# Patient Record
Sex: FEMALE | Race: WHITE | NOT HISPANIC OR LATINO | Employment: FULL TIME | URBAN - METROPOLITAN AREA
[De-identification: names, ages, dates, MRNs, and addresses within clinical notes are randomized per-mention and may not be internally consistent; named-entity substitution may affect disease eponyms.]

---

## 2019-03-14 ENCOUNTER — LAB VISIT (OUTPATIENT)
Dept: LAB | Facility: HOSPITAL | Age: 54
End: 2019-03-14
Attending: INTERNAL MEDICINE
Payer: COMMERCIAL

## 2019-03-14 ENCOUNTER — OFFICE VISIT (OUTPATIENT)
Dept: HEMATOLOGY/ONCOLOGY | Facility: CLINIC | Age: 54
End: 2019-03-14
Payer: COMMERCIAL

## 2019-03-14 VITALS
OXYGEN SATURATION: 97 % | WEIGHT: 293 LBS | TEMPERATURE: 98 F | BODY MASS INDEX: 47.09 KG/M2 | HEART RATE: 97 BPM | DIASTOLIC BLOOD PRESSURE: 79 MMHG | HEIGHT: 66 IN | SYSTOLIC BLOOD PRESSURE: 139 MMHG

## 2019-03-14 DIAGNOSIS — Z85.3 PERSONAL HISTORY OF MALIGNANT NEOPLASM OF BREAST: ICD-10-CM

## 2019-03-14 DIAGNOSIS — C50.912 MALIGNANT NEOPLASM OF LEFT BREAST IN FEMALE, ESTROGEN RECEPTOR POSITIVE, UNSPECIFIED SITE OF BREAST: ICD-10-CM

## 2019-03-14 DIAGNOSIS — Z17.0 MALIGNANT NEOPLASM OF LEFT BREAST IN FEMALE, ESTROGEN RECEPTOR POSITIVE, UNSPECIFIED SITE OF BREAST: Primary | ICD-10-CM

## 2019-03-14 DIAGNOSIS — C50.912 MALIGNANT NEOPLASM OF LEFT BREAST IN FEMALE, ESTROGEN RECEPTOR POSITIVE, UNSPECIFIED SITE OF BREAST: Primary | ICD-10-CM

## 2019-03-14 DIAGNOSIS — Z17.0 MALIGNANT NEOPLASM OF LEFT BREAST IN FEMALE, ESTROGEN RECEPTOR POSITIVE, UNSPECIFIED SITE OF BREAST: ICD-10-CM

## 2019-03-14 LAB
ALBUMIN SERPL BCP-MCNC: 3.9 G/DL
ALP SERPL-CCNC: 133 U/L
ALT SERPL W/O P-5'-P-CCNC: 62 U/L
ANION GAP SERPL CALC-SCNC: 10 MMOL/L
AST SERPL-CCNC: 35 U/L
BASOPHILS # BLD AUTO: 0.04 K/UL
BASOPHILS NFR BLD: 0.5 %
BILIRUB SERPL-MCNC: 0.3 MG/DL
BUN SERPL-MCNC: 15 MG/DL
CALCIUM SERPL-MCNC: 9.9 MG/DL
CHLORIDE SERPL-SCNC: 104 MMOL/L
CO2 SERPL-SCNC: 27 MMOL/L
CREAT SERPL-MCNC: 1.1 MG/DL
DIFFERENTIAL METHOD: ABNORMAL
EOSINOPHIL # BLD AUTO: 0.3 K/UL
EOSINOPHIL NFR BLD: 3.7 %
ERYTHROCYTE [DISTWIDTH] IN BLOOD BY AUTOMATED COUNT: 13.7 %
EST. GFR  (AFRICAN AMERICAN): >60 ML/MIN/1.73 M^2
EST. GFR  (NON AFRICAN AMERICAN): 57 ML/MIN/1.73 M^2
GLUCOSE SERPL-MCNC: 102 MG/DL
HCT VFR BLD AUTO: 41.9 %
HGB BLD-MCNC: 14.1 G/DL
LYMPHOCYTES # BLD AUTO: 2.3 K/UL
LYMPHOCYTES NFR BLD: 27 %
MCH RBC QN AUTO: 31.4 PG
MCHC RBC AUTO-ENTMCNC: 33.7 G/DL
MCV RBC AUTO: 93 FL
MONOCYTES # BLD AUTO: 0.6 K/UL
MONOCYTES NFR BLD: 7 %
NEUTROPHILS # BLD AUTO: 5.2 K/UL
NEUTROPHILS NFR BLD: 61.8 %
PLATELET # BLD AUTO: 266 K/UL
PMV BLD AUTO: 10 FL
POTASSIUM SERPL-SCNC: 3.7 MMOL/L
PROT SERPL-MCNC: 7.9 G/DL
RBC # BLD AUTO: 4.49 M/UL
SODIUM SERPL-SCNC: 141 MMOL/L
WBC # BLD AUTO: 8.4 K/UL

## 2019-03-14 PROCEDURE — 80053 COMPREHEN METABOLIC PANEL: CPT

## 2019-03-14 PROCEDURE — 3008F PR BODY MASS INDEX (BMI) DOCUMENTED: ICD-10-PCS | Mod: CPTII,S$GLB,, | Performed by: INTERNAL MEDICINE

## 2019-03-14 PROCEDURE — 3078F DIAST BP <80 MM HG: CPT | Mod: CPTII,S$GLB,, | Performed by: INTERNAL MEDICINE

## 2019-03-14 PROCEDURE — 85025 COMPLETE CBC W/AUTO DIFF WBC: CPT

## 2019-03-14 PROCEDURE — 99999 PR PBB SHADOW E&M-NEW PATIENT-LVL III: ICD-10-PCS | Mod: PBBFAC,,, | Performed by: INTERNAL MEDICINE

## 2019-03-14 PROCEDURE — 99213 PR OFFICE/OUTPT VISIT, EST, LEVL III, 20-29 MIN: ICD-10-PCS | Mod: S$GLB,,, | Performed by: INTERNAL MEDICINE

## 2019-03-14 PROCEDURE — 3008F BODY MASS INDEX DOCD: CPT | Mod: CPTII,S$GLB,, | Performed by: INTERNAL MEDICINE

## 2019-03-14 PROCEDURE — 99999 PR PBB SHADOW E&M-NEW PATIENT-LVL III: CPT | Mod: PBBFAC,,, | Performed by: INTERNAL MEDICINE

## 2019-03-14 PROCEDURE — 82378 CARCINOEMBRYONIC ANTIGEN: CPT

## 2019-03-14 PROCEDURE — 3078F PR MOST RECENT DIASTOLIC BLOOD PRESSURE < 80 MM HG: ICD-10-PCS | Mod: CPTII,S$GLB,, | Performed by: INTERNAL MEDICINE

## 2019-03-14 PROCEDURE — 3075F SYST BP GE 130 - 139MM HG: CPT | Mod: CPTII,S$GLB,, | Performed by: INTERNAL MEDICINE

## 2019-03-14 PROCEDURE — 86300 IMMUNOASSAY TUMOR CA 15-3: CPT

## 2019-03-14 PROCEDURE — 99213 OFFICE O/P EST LOW 20 MIN: CPT | Mod: S$GLB,,, | Performed by: INTERNAL MEDICINE

## 2019-03-14 PROCEDURE — 3075F PR MOST RECENT SYSTOLIC BLOOD PRESS GE 130-139MM HG: ICD-10-PCS | Mod: CPTII,S$GLB,, | Performed by: INTERNAL MEDICINE

## 2019-03-14 RX ORDER — ALLOPURINOL 100 MG/1
100 TABLET ORAL DAILY
COMMUNITY
End: 2023-04-14

## 2019-03-14 RX ORDER — LOSARTAN POTASSIUM 100 MG/1
100 TABLET ORAL DAILY
COMMUNITY
End: 2022-05-11 | Stop reason: SDUPTHER

## 2019-03-14 RX ORDER — AMLODIPINE BESYLATE 5 MG/1
5 TABLET ORAL DAILY
COMMUNITY
End: 2022-05-11 | Stop reason: SDUPTHER

## 2019-03-14 NOTE — PROGRESS NOTES
Chief Complaint :   Carcinoma Left Breast.    Hx of Present illness :  Patient is a 54 y.o. year old female who presents to the clinic today for annualo Oncology followup. Patient was under my care at [facility]. Dx'd to have carcinoma left Breast in Dec 2001. Underwent Bilateral mastectomy with reconstruction. Had Adjuvant chemotherapy with Taxotere, Epirubicin and Cytoxan and radiation. This was followed by five years of adjuvant harmonal therapy with Tamoxifen/Femara.      Allergies :    Review of patient's allergies indicates:   Allergen Reactions    Codeine Nausea Only       Occupation :  operations    Transfusion :  None    Menstrual & obstetric Hx :   2; para 2  Age of menarche: 10  Age of first pregnancy:  23  Lactation history: no  Age of menopause:  After chemoat age 38  HRT:  none    Present Meds :   Medication List with Changes/Refills   Current Medications    ASPIRIN 81 MG CHEWABLE TABLET    Take 1 tablet (81 mg total) by mouth once daily.    CYANOCOBALAMIN 1,000 MCG/ML INJECTION    Inject 1 mL (1,000 mcg total) into the muscle every 30 days.    ERGOCALCIFEROL (ERGOCALCIFEROL) 50,000 UNIT CAP    Take 1 capsule (50,000 Units total) by mouth twice a week.    FUROSEMIDE (LASIX) 40 MG TABLET    Take 1 tablet (40 mg total) by mouth daily as needed. Take  with  Potassium    LORAZEPAM (ATIVAN) 1 MG TABLET    TAKE 1 TABLET BY MOUTH EVERY 8 HOURS AS NEEDED    VALSARTAN (DIOVAN) 320 MG TABLET    Take 1 tablet (320 mg total) by mouth once daily.       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 status post mastectomy 2009    Chronic depression     GERD  (gastroesophageal reflux disease)     Hypercalcemia syndrome     Hyperlipidemia LDL goal <130     Hyperparathyroidism due to vitamin D deficiency     Insomnia     Lumbar radiculitis     Lymphedema of arm     Morbid obesity     Vitamin D deficiency disease        Travel Hx :  N/A    Immunization :  Immunization History   Administered Date(s) Administered    Pneumococcal Polysaccharide - 23 Valent 04/10/2014       Family Hx :  Family History   Problem Relation Age of Onset    COPD Mother     Heart disease Father     Hypertension Father     Hyperlipidemia Father     COPD Sister     Stroke Brother        Social Hx :  Social History     Socioeconomic History    Marital status:      Spouse name: Not on file    Number of children: 2    Years of education: [employer]    Highest education level: Not on file   Social Needs    Financial resource strain: Not on file    Food insecurity - worry: Not on file    Food insecurity - inability: Not on file    Transportation needs - medical: Not on file    Transportation needs - non-medical: Not on file   Occupational History    Occupation: Works for a [employer]     Employer: [employer]   Tobacco Use    Smoking status: Never Smoker   Substance and Sexual Activity    Alcohol use: No    Drug use: No    Sexual activity: Yes     Partners: Male   Other Topics Concern    Not on file   Social History Narrative    Adult Screenings updated and reviewed  4/10/14    Mammogram( for females) history of bilateral mastectomy    Pap ( for females)- years      Colonoscopy - done 2006 normal    Flu shot - 2013    Td 2005    Pneumovax updated 4/10/14 due again  2019    Zostavax recommended one time at  age  59    Eye exam  Overdue     Bone density overdue        Surgery :  Back surgery; Bilateral mastectomy with reconstruction.    Symptoms :    Review of Systems   Constitutional: Negative for chills, fever and weight loss.        Some weight Gain   HENT:  Negative for congestion, hearing loss, nosebleeds, sore throat and tinnitus.    Eyes: Negative for blurred vision, double vision and photophobia.        Wears glasses. Blind Left eye   Respiratory: Negative for cough, hemoptysis and shortness of breath.         Hx of Sleep Apnea. Uses C-PAP   Cardiovascular: Negative for chest pain, palpitations, orthopnea, claudication and leg swelling.   Gastrointestinal: Positive for constipation. Negative for abdominal pain, blood in stool, diarrhea, heartburn, nausea and vomiting.   Genitourinary: Negative for dysuria, flank pain, frequency, hematuria and urgency.   Musculoskeletal: Negative for back pain, joint pain, myalgias and neck pain.   Skin: Negative for itching and rash.   Neurological: Negative for dizziness, tingling, tremors and headaches.   Endo/Heme/Allergies: Negative for environmental allergies. Does not bruise/bleed easily.   Psychiatric/Behavioral: Negative for depression and hallucinations. The patient is nervous/anxious. The patient does not have insomnia.        Physical Exam :   Physical Exam   Constitutional: She is oriented to person, place, and time and well-developed, well-nourished, and in no distress. Vital signs are normal. No distress.   HENT:   Head: Normocephalic and atraumatic.   Right Ear: External ear normal.   Left Ear: External ear normal.   Nose: Nose normal.   Mouth/Throat: Oropharynx is clear and moist. No oropharyngeal exudate.   Eyes: Conjunctivae, EOM and lids are normal. Lids are everted and swept, no foreign bodies found. Right eye exhibits no discharge. Left eye exhibits no discharge. No scleral icterus.   Neck: Trachea normal, normal range of motion and phonation normal. Neck supple. Normal carotid pulses, no hepatojugular reflux and no JVD present. No tracheal tenderness present. Carotid bruit is not present. No tracheal deviation present. No thyroid mass and no thyromegaly present.   Cardiovascular: Normal rate, regular rhythm,  normal heart sounds, intact distal pulses and normal pulses. PMI is not displaced. Exam reveals no gallop and no friction rub.   No murmur heard.  Pulmonary/Chest: Effort normal and breath sounds normal. No stridor. No respiratory distress. She has no wheezes. She has no rales. She exhibits no tenderness.       Abdominal: Soft. Normal appearance, normal aorta and bowel sounds are normal. She exhibits no distension, no ascites and no mass. There is no hepatosplenomegaly. There is no tenderness. There is no rebound, no guarding and no CVA tenderness. No hernia.   Musculoskeletal: Normal range of motion. She exhibits edema. She exhibits no tenderness or deformity.        Arms:  Lymphadenopathy:        Head (right side): No submental, no submandibular, no tonsillar, no preauricular, no posterior auricular and no occipital adenopathy present.        Head (left side): No submental, no submandibular, no tonsillar, no preauricular, no posterior auricular and no occipital adenopathy present.     She has no cervical adenopathy.     She has no axillary adenopathy.        Right: No inguinal, no supraclavicular and no epitrochlear adenopathy present.        Left: No inguinal, no supraclavicular and no epitrochlear adenopathy present.   Neurological: She is alert and oriented to person, place, and time. She has normal reflexes. She displays normal reflexes. No cranial nerve deficit. She exhibits normal muscle tone. Gait normal. Coordination normal. GCS score is 15.   Skin: Skin is warm and dry. No rash noted. She is not diaphoretic. No erythema. No pallor.   Psychiatric: Mood, memory, affect and judgment normal.         Labs & Imaging : Pending        Dx :  Remote Hx of Carcinoma left Breast      Assessment & Plan:  Reviewed with patient. CBC,CMP,CEA,CA 27.29 today.  Advised to use Jobst pump. Continue annual followup.

## 2019-03-15 LAB — CEA SERPL-MCNC: <1 NG/ML

## 2019-03-18 LAB — CANCER AG27-29 SERPL-ACNC: 20.4 U/ML

## 2019-10-10 LAB
PAP RECOMMENDATION EXT: NORMAL
PAP SMEAR: NORMAL

## 2020-01-03 ENCOUNTER — HOSPITAL ENCOUNTER (OUTPATIENT)
Dept: CARDIOLOGY | Facility: HOSPITAL | Age: 55
Discharge: HOME | End: 2020-01-03
Attending: INTERNAL MEDICINE
Payer: COMMERCIAL

## 2020-01-03 ENCOUNTER — HOSPITAL ENCOUNTER (OUTPATIENT)
Dept: RADIOLOGY | Facility: HOSPITAL | Age: 55
Discharge: HOME | End: 2020-01-03
Attending: INTERNAL MEDICINE
Payer: COMMERCIAL

## 2020-01-03 VITALS — BODY MASS INDEX: 47.52 KG/M2 | WEIGHT: 290 LBS

## 2020-01-03 DIAGNOSIS — R06.02 SOB (SHORTNESS OF BREATH): Primary | ICD-10-CM

## 2020-01-03 DIAGNOSIS — R06.02 SOB (SHORTNESS OF BREATH): ICD-10-CM

## 2020-01-03 DIAGNOSIS — N93.9 ABNORMAL VAGINAL BLEEDING: ICD-10-CM

## 2020-01-03 DIAGNOSIS — N83.291 OTHER OVARIAN CYST, RIGHT SIDE: ICD-10-CM

## 2020-01-03 LAB
AORTIC ROOT ANNULUS: 2.42 CM
ASCENDING AORTA: 2.74 CM
AV INDEX (PROSTH): 0.7
AV MEAN GRADIENT: 7 MMHG
AV PEAK GRADIENT: 11 MMHG
AV VALVE AREA: 2.67 CM2
AV VELOCITY RATIO: 0.74
CV ECHO LV RWT: 0.38 CM
CV STRESS BASE HR: 85 BPM
DIASTOLIC BLOOD PRESSURE: 60 MMHG
DOP CALC AO PEAK VEL: 1.67 M/S
DOP CALC AO VTI: 32.41 CM
DOP CALC LVOT AREA: 3.8 CM2
DOP CALC LVOT DIAMETER: 2.21 CM
DOP CALC LVOT PEAK VEL: 1.24 M/S
DOP CALC LVOT STROKE VOLUME: 86.57 CM3
DOP CALCLVOT PEAK VEL VTI: 22.58 CM
E WAVE DECELERATION TIME: 189.61 MSEC
E/A RATIO: 1
ECHO LV POSTERIOR WALL: 0.95 CM (ref 0.6–1.1)
FRACTIONAL SHORTENING: 38 % (ref 28–44)
INTERVENTRICULAR SEPTUM: 1.13 CM (ref 0.6–1.1)
IVRT: 0.08 MSEC
LA MAJOR: 4.98 CM
LA MINOR: 4.91 CM
LA WIDTH: 2.79 CM
LEFT ATRIUM SIZE: 3.54 CM
LEFT ATRIUM VOLUME: 41.51 CM3
LEFT INTERNAL DIMENSION IN SYSTOLE: 3.11 CM (ref 2.1–4)
LEFT VENTRICLE DIASTOLIC VOLUME: 117.3 ML
LEFT VENTRICLE SYSTOLIC VOLUME: 38.27 ML
LEFT VENTRICULAR INTERNAL DIMENSION IN DIASTOLE: 4.98 CM (ref 3.5–6)
LEFT VENTRICULAR MASS: 190.62 G
MV PEAK A VEL: 1.14 M/S
MV PEAK E VEL: 1.14 M/S
OHS CV CPX 85 PERCENT MAX PREDICTED HEART RATE MALE: 134
OHS CV CPX ESTIMATED METS: 1
OHS CV CPX MAX PREDICTED HEART RATE: 158
OHS CV CPX PATIENT IS FEMALE: 1
OHS CV CPX PATIENT IS MALE: 0
OHS CV CPX PEAK DIASTOLIC BLOOD PRESSURE: 60 MMHG
OHS CV CPX PEAK HEAR RATE: 146 BPM
OHS CV CPX PEAK RATE PRESSURE PRODUCT: ABNORMAL
OHS CV CPX PEAK SYSTOLIC BLOOD PRESSURE: 146 MMHG
OHS CV CPX PERCENT MAX PREDICTED HEART RATE ACHIEVED: 93
OHS CV CPX RATE PRESSURE PRODUCT PRESENTING: ABNORMAL
PULM VEIN S/D RATIO: 1.32
PV PEAK D VEL: 0.41 M/S
PV PEAK S VEL: 0.54 M/S
RA MAJOR: 4.48 CM
RA PRESSURE: 8 MMHG
RA WIDTH: 3.43 CM
RIGHT VENTRICULAR END-DIASTOLIC DIMENSION: 2.62 CM
STJ: 2.14 CM
STRESS ECHO POST EXERCISE DUR MIN: 8 MINUTES
STRESS ECHO POST EXERCISE DUR SEC: 26 SECONDS
SYSTOLIC BLOOD PRESSURE: 146 MMHG

## 2020-01-03 PROCEDURE — 93351 STRESS TTE COMPLETE: CPT

## 2020-01-03 PROCEDURE — 63600175 PHARM REV CODE 636 W HCPCS: Performed by: INTERNAL MEDICINE

## 2020-01-03 RX ORDER — DOBUTAMINE HYDROCHLORIDE 400 MG/100ML
10 INJECTION INTRAVENOUS CONTINUOUS
Status: DISCONTINUED | OUTPATIENT
Start: 2020-01-03 | End: 2021-11-12

## 2020-01-03 RX ADMIN — DOBUTAMINE HYDROCHLORIDE 10 MCG/KG/MIN: 400 INJECTION INTRAVENOUS at 11:01

## 2020-01-03 NOTE — NURSING
1115    Patient on table, ready for test.  Allergies/history confirmed.  Connected to monitor EKG and NIBP  1115    Yael REYSE @ BS, consent obtained, all questions answered.  1126    Test started per protocol, See EKG sheet for VS   1129   Dobutamine increased to 20mcg per protocol to increase heart rate.  Pt tolerating test well   1132    Dobutamine increased to 30 mcg per protocol to increase heart rate, pt tolerating test well.    1133   Target heart rate achieved.  1135    Testing phase complete, dobutamine off.  NS up at rapid rate for recovery phase   1155     Recovery phase complete.  Patient states she feels normal, no distress.  NS dc'd 200cc infused.               Iv d/c'd.  Pt released to Cardio.

## 2020-10-19 ENCOUNTER — TELEPHONE (OUTPATIENT)
Dept: HEMATOLOGY/ONCOLOGY | Facility: CLINIC | Age: 55
End: 2020-10-19

## 2020-10-19 NOTE — TELEPHONE ENCOUNTER
Returned patients call. Patient request labs get sent to [facility]. Will send lab orders over now. Patient thanked me for my call.----- Message from Maksim Cabrera sent at 10/19/2020  4:06 PM CDT -----  Regarding: Orders  Contact: CHASE DUMONT [[id number]]  Name of Who is Calling: CHASE DUMONT [[id number]]      What is the request in detail: Would like to speak with staff in regards to having lab orders sent to [facility] on [facility] in [facility]. Please advise    Can the clinic reply by [facility]: no      [facility] [phone]

## 2020-11-05 NOTE — PROGRESS NOTES
Chief Complaint :   Carcinoma Left Breast.    Hx of Present illness :  Patient is a 55 y.o. year old female who presents to the clinic today for annualo Oncology followup. Patient was under my care at [facility]. Dx'd to have carcinoma left Breast in Dec 2001. Underwent Bilateral mastectomy with reconstruction. Had Adjuvant chemotherapy with Taxotere, Epirubicin and Cytoxan and radiation. This was followed by five years of adjuvant harmonal therapy with Tamoxifen/Femara.  In 2019 had Sx of Uterine bleeding. In 2020, had  Had Uterine Curettage, and Bilateral salpingo oopherectomy. Benign findings.       Allergies :    Review of patient's allergies indicates:   Allergen Reactions    Codeine Nausea Only       Occupation :  operations    Transfusion :  None    Menstrual & obstetric Hx :   2; para 2  Age of menarche: 10  Age of first pregnancy:  23  Lactation history: no  Age of menopause:  After chemoat age 38  HRT:  none    Present Meds :   Medication List with Changes/Refills   Current Medications    ALLOPURINOL (ZYLOPRIM) 100 MG TABLET    Take 100 mg by mouth once daily.    AMLODIPINE (NORVASC) 5 MG TABLET    Take 5 mg by mouth once daily.    FUROSEMIDE (LASIX) 40 MG TABLET    Take 1 tablet (40 mg total) by mouth daily as needed. Take  with  Potassium    LORAZEPAM (ATIVAN) 1 MG TABLET    TAKE 1 TABLET BY MOUTH EVERY 8 HOURS AS NEEDED    LOSARTAN (COZAAR) 100 MG TABLET    Take 100 mg by mouth once daily.       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 status post mastectomy 2009    Chronic depression     GERD (gastroesophageal reflux disease)      Hypercalcemia syndrome     Hyperlipidemia LDL goal <130     Hyperparathyroidism due to vitamin D deficiency     Insomnia     Lumbar radiculitis     Lymphedema of arm     Morbid obesity     Vitamin D deficiency disease        Travel Hx :  N/A    Immunization :  Immunization History   Administered Date(s) Administered    Pneumococcal Polysaccharide - 23 Valent 04/10/2014       Family Hx :  Family History   Problem Relation Age of Onset    COPD Mother     Heart disease Father     Hypertension Father     Hyperlipidemia Father     COPD Sister     Stroke Brother        Social Hx :  Social History     Socioeconomic History    Marital status:      Spouse name: Not on file    Number of children: 2    Years of education: wilder    Highest education level: Not on file   Occupational History    Occupation: Works for a [employer]     Employer: [employer]   Social Needs    Financial resource strain: Not on file    Food insecurity     Worry: Not on file     Inability: Not on file    Transportation needs     Medical: Not on file     Non-medical: Not on file   Tobacco Use    Smoking status: Never Smoker   Substance and Sexual Activity    Alcohol use: No    Drug use: No    Sexual activity: Yes     Partners: Male   Lifestyle    Physical activity     Days per week: Not on file     Minutes per session: Not on file    Stress: Not on file   Relationships    Social connections     Talks on phone: Not on file     Gets together: Not on file     Attends Mosque service: Not on file     Active member of club or organization: Not on file     Attends meetings of clubs or organizations: Not on file     Relationship status: Not on file   Other Topics Concern    Not on file   Social History Narrative    Adult Screenings updated and reviewed  4/10/14    Mammogram( for females) history of bilateral mastectomy    Pap ( for females)- years      Colonoscopy - done 2006 normal    Flu shot - 2013    Td 2005     Pneumovax updated 4/10/14 due again  2019    Zostavax recommended one time at  age  59    Eye exam  Overdue     Bone density overdue        Surgery :  Back surgery; Bilateral mastectomy with reconstruction.    Symptoms :    Review of Systems   Constitutional: Negative for chills, fever, malaise/fatigue and weight loss.        Some weight Gain   HENT: Positive for congestion. Negative for hearing loss, nosebleeds, sore throat and tinnitus.    Eyes: Negative for blurred vision, double vision and photophobia.        Wears glasses. Blind Left eye   Respiratory: Negative for cough, hemoptysis and shortness of breath.         Hx of Sleep Apnea. Uses C-PAP   Cardiovascular: Negative for chest pain, palpitations, claudication and leg swelling.   Gastrointestinal: Positive for constipation. Negative for abdominal pain, blood in stool, diarrhea, heartburn, nausea and vomiting.   Genitourinary: Negative for dysuria, flank pain, frequency, hematuria and urgency.   Musculoskeletal: Positive for back pain. Negative for falls, joint pain, myalgias and neck pain.   Skin: Negative for itching and rash.   Neurological: Negative for dizziness, tingling, tremors, sensory change and headaches.   Endo/Heme/Allergies: Negative for environmental allergies. Does not bruise/bleed easily.   Psychiatric/Behavioral: Negative for depression and hallucinations. The patient is nervous/anxious. The patient does not have insomnia.        Physical Exam :   Physical Exam   Constitutional: She is oriented to person, place, and time and well-developed, well-nourished, and in no distress. Vital signs are normal. No distress.   HENT:   Head: Normocephalic and atraumatic.   Right Ear: External ear normal.   Left Ear: External ear normal.   Nose: Nose normal.   Mouth/Throat: Oropharynx is clear and moist. No oropharyngeal exudate.   Eyes: Conjunctivae, EOM and lids are normal. Lids are everted and swept, no foreign bodies found. Right eye exhibits no  discharge. Left eye exhibits no discharge. No scleral icterus.   Neck: Trachea normal, normal range of motion and phonation normal. Neck supple. Normal carotid pulses, no hepatojugular reflux and no JVD present. No tracheal tenderness present. Carotid bruit is not present. No tracheal deviation present. No thyroid mass and no thyromegaly present.   Cardiovascular: Normal rate, regular rhythm, normal heart sounds, intact distal pulses and normal pulses. PMI is not displaced. Exam reveals no gallop and no friction rub.   No murmur heard.  Pulmonary/Chest: Effort normal and breath sounds normal. No stridor. No respiratory distress. She has no wheezes. She has no rales. She exhibits no tenderness.   Abdominal: Soft. Normal appearance, normal aorta and bowel sounds are normal. She exhibits no distension, no ascites and no mass. There is no hepatosplenomegaly. There is no abdominal tenderness. There is no rebound, no guarding and no CVA tenderness. No hernia.   Musculoskeletal: Normal range of motion.         General: Edema present. No tenderness or deformity.        Arms:    Lymphadenopathy:        Head (right side): No submental, no submandibular, no tonsillar, no preauricular, no posterior auricular and no occipital adenopathy present.        Head (left side): No submental, no submandibular, no tonsillar, no preauricular, no posterior auricular and no occipital adenopathy present.     She has no cervical adenopathy.     She has no axillary adenopathy.        Right: No supraclavicular and no epitrochlear adenopathy present.        Left: No supraclavicular and no epitrochlear adenopathy present.   Neurological: She is alert and oriented to person, place, and time. She has normal reflexes. No cranial nerve deficit. She exhibits normal muscle tone. Gait normal. Coordination normal. GCS score is 15.   Skin: Skin is warm and dry. No rash noted. She is not diaphoretic. No erythema. No pallor.   Psychiatric: Mood, memory,  affect and judgment normal.         Labs & Imaging :  10/22/2020 : NFBS 85; cr.0.96; eGFR 60. Ca 9.8. ; AST 48. CEA 0.8;  Hgb  14.4; hct 43.3; MCV 94; Platelets 274,000 ANC 4,800.        Dx :  Remote Hx of Carcinoma left Breast. Elevated Liver enzymes      Assessment & Plan:  Reviewed with patient.   Advised to use Jobst pump.  CMP in one month. If Liver enzymes still high, will get PET/CTContinue annual followup.

## 2020-11-10 ENCOUNTER — OFFICE VISIT (OUTPATIENT)
Dept: HEMATOLOGY/ONCOLOGY | Facility: CLINIC | Age: 55
End: 2020-11-10
Payer: COMMERCIAL

## 2020-11-10 VITALS
WEIGHT: 293 LBS | OXYGEN SATURATION: 98 % | HEART RATE: 98 BPM | BODY MASS INDEX: 47.09 KG/M2 | HEIGHT: 66 IN | TEMPERATURE: 99 F | SYSTOLIC BLOOD PRESSURE: 151 MMHG | DIASTOLIC BLOOD PRESSURE: 92 MMHG

## 2020-11-10 DIAGNOSIS — Z17.0 MALIGNANT NEOPLASM OF LEFT BREAST IN FEMALE, ESTROGEN RECEPTOR POSITIVE, UNSPECIFIED SITE OF BREAST: Primary | ICD-10-CM

## 2020-11-10 DIAGNOSIS — R74.8 ELEVATED LIVER ENZYMES: ICD-10-CM

## 2020-11-10 DIAGNOSIS — Z85.3 PERSONAL HISTORY OF MALIGNANT NEOPLASM OF BREAST: ICD-10-CM

## 2020-11-10 DIAGNOSIS — C50.912 MALIGNANT NEOPLASM OF LEFT BREAST IN FEMALE, ESTROGEN RECEPTOR POSITIVE, UNSPECIFIED SITE OF BREAST: Primary | ICD-10-CM

## 2020-11-10 PROCEDURE — 3080F DIAST BP >= 90 MM HG: CPT | Mod: CPTII,S$GLB,, | Performed by: INTERNAL MEDICINE

## 2020-11-10 PROCEDURE — 99213 PR OFFICE/OUTPT VISIT, EST, LEVL III, 20-29 MIN: ICD-10-PCS | Mod: S$GLB,,, | Performed by: INTERNAL MEDICINE

## 2020-11-10 PROCEDURE — 3077F SYST BP >= 140 MM HG: CPT | Mod: CPTII,S$GLB,, | Performed by: INTERNAL MEDICINE

## 2020-11-10 PROCEDURE — 3008F BODY MASS INDEX DOCD: CPT | Mod: CPTII,S$GLB,, | Performed by: INTERNAL MEDICINE

## 2020-11-10 PROCEDURE — 3077F PR MOST RECENT SYSTOLIC BLOOD PRESSURE >= 140 MM HG: ICD-10-PCS | Mod: CPTII,S$GLB,, | Performed by: INTERNAL MEDICINE

## 2020-11-10 PROCEDURE — 3008F PR BODY MASS INDEX (BMI) DOCUMENTED: ICD-10-PCS | Mod: CPTII,S$GLB,, | Performed by: INTERNAL MEDICINE

## 2020-11-10 PROCEDURE — 99213 OFFICE O/P EST LOW 20 MIN: CPT | Mod: S$GLB,,, | Performed by: INTERNAL MEDICINE

## 2020-11-10 PROCEDURE — 99999 PR PBB SHADOW E&M-EST. PATIENT-LVL III: ICD-10-PCS | Mod: PBBFAC,,, | Performed by: INTERNAL MEDICINE

## 2020-11-10 PROCEDURE — 3080F PR MOST RECENT DIASTOLIC BLOOD PRESSURE >= 90 MM HG: ICD-10-PCS | Mod: CPTII,S$GLB,, | Performed by: INTERNAL MEDICINE

## 2020-11-10 PROCEDURE — 99999 PR PBB SHADOW E&M-EST. PATIENT-LVL III: CPT | Mod: PBBFAC,,, | Performed by: INTERNAL MEDICINE

## 2020-12-11 LAB — CRC RECOMMENDATION EXT: NORMAL

## 2021-04-28 ENCOUNTER — PATIENT MESSAGE (OUTPATIENT)
Dept: RESEARCH | Facility: HOSPITAL | Age: 56
End: 2021-04-28

## 2021-08-03 ENCOUNTER — TELEPHONE (OUTPATIENT)
Dept: HEMATOLOGY/ONCOLOGY | Facility: CLINIC | Age: 56
End: 2021-08-03

## 2021-08-04 DIAGNOSIS — Z17.0 MALIGNANT NEOPLASM OF LEFT BREAST IN FEMALE, ESTROGEN RECEPTOR POSITIVE, UNSPECIFIED SITE OF BREAST: Primary | ICD-10-CM

## 2021-08-04 DIAGNOSIS — R74.8 ELEVATED LIVER ENZYMES: ICD-10-CM

## 2021-08-04 DIAGNOSIS — Z85.3 PERSONAL HISTORY OF MALIGNANT NEOPLASM OF BREAST: ICD-10-CM

## 2021-08-04 DIAGNOSIS — C50.912 MALIGNANT NEOPLASM OF LEFT BREAST IN FEMALE, ESTROGEN RECEPTOR POSITIVE, UNSPECIFIED SITE OF BREAST: Primary | ICD-10-CM

## 2021-09-23 ENCOUNTER — LAB VISIT (OUTPATIENT)
Dept: LAB | Facility: HOSPITAL | Age: 56
End: 2021-09-23
Attending: INTERNAL MEDICINE
Payer: COMMERCIAL

## 2021-09-23 DIAGNOSIS — Z85.3 PERSONAL HISTORY OF MALIGNANT NEOPLASM OF BREAST: ICD-10-CM

## 2021-09-23 DIAGNOSIS — Z17.0 MALIGNANT NEOPLASM OF LEFT BREAST IN FEMALE, ESTROGEN RECEPTOR POSITIVE, UNSPECIFIED SITE OF BREAST: ICD-10-CM

## 2021-09-23 DIAGNOSIS — R74.8 ELEVATED LIVER ENZYMES: ICD-10-CM

## 2021-09-23 DIAGNOSIS — C50.912 MALIGNANT NEOPLASM OF LEFT BREAST IN FEMALE, ESTROGEN RECEPTOR POSITIVE, UNSPECIFIED SITE OF BREAST: ICD-10-CM

## 2021-09-23 LAB
ALBUMIN SERPL BCP-MCNC: 3.8 G/DL (ref 3.5–5.2)
ALP SERPL-CCNC: 119 U/L (ref 55–135)
ALT SERPL W/O P-5'-P-CCNC: 50 U/L (ref 10–44)
ANION GAP SERPL CALC-SCNC: 9 MMOL/L (ref 8–16)
AST SERPL-CCNC: 30 U/L (ref 10–40)
BASOPHILS # BLD AUTO: 0.04 K/UL (ref 0–0.2)
BASOPHILS NFR BLD: 0.5 % (ref 0–1.9)
BILIRUB SERPL-MCNC: 0.3 MG/DL (ref 0.1–1)
BUN SERPL-MCNC: 10 MG/DL (ref 6–20)
CALCIUM SERPL-MCNC: 10.2 MG/DL (ref 8.7–10.5)
CANCER AG125 SERPL-ACNC: 9 U/ML (ref 0–30)
CEA SERPL-MCNC: <1 NG/ML (ref 0–5)
CHLORIDE SERPL-SCNC: 104 MMOL/L (ref 95–110)
CO2 SERPL-SCNC: 27 MMOL/L (ref 23–29)
CREAT SERPL-MCNC: 0.8 MG/DL (ref 0.5–1.4)
DIFFERENTIAL METHOD: ABNORMAL
EOSINOPHIL # BLD AUTO: 0.2 K/UL (ref 0–0.5)
EOSINOPHIL NFR BLD: 2.5 % (ref 0–8)
ERYTHROCYTE [DISTWIDTH] IN BLOOD BY AUTOMATED COUNT: 12.7 % (ref 11.5–14.5)
EST. GFR  (AFRICAN AMERICAN): >60 ML/MIN/1.73 M^2
EST. GFR  (NON AFRICAN AMERICAN): >60 ML/MIN/1.73 M^2
GLUCOSE SERPL-MCNC: 98 MG/DL (ref 70–110)
HCT VFR BLD AUTO: 39.6 % (ref 37–48.5)
HGB BLD-MCNC: 13.6 G/DL (ref 12–16)
IMM GRANULOCYTES # BLD AUTO: 0.04 K/UL (ref 0–0.04)
IMM GRANULOCYTES NFR BLD AUTO: 0.5 % (ref 0–0.5)
LYMPHOCYTES # BLD AUTO: 1.9 K/UL (ref 1–4.8)
LYMPHOCYTES NFR BLD: 25.5 % (ref 18–48)
MCH RBC QN AUTO: 31.2 PG (ref 27–31)
MCHC RBC AUTO-ENTMCNC: 34.3 G/DL (ref 32–36)
MCV RBC AUTO: 91 FL (ref 82–98)
MONOCYTES # BLD AUTO: 0.6 K/UL (ref 0.3–1)
MONOCYTES NFR BLD: 8.1 % (ref 4–15)
NEUTROPHILS # BLD AUTO: 4.6 K/UL (ref 1.8–7.7)
NEUTROPHILS NFR BLD: 62.9 % (ref 38–73)
NRBC BLD-RTO: 0 /100 WBC
PLATELET # BLD AUTO: 224 K/UL (ref 150–450)
PMV BLD AUTO: 9.5 FL (ref 9.2–12.9)
POTASSIUM SERPL-SCNC: 3.7 MMOL/L (ref 3.5–5.1)
PROT SERPL-MCNC: 8.1 G/DL (ref 6–8.4)
RBC # BLD AUTO: 4.36 M/UL (ref 4–5.4)
SODIUM SERPL-SCNC: 140 MMOL/L (ref 136–145)
WBC # BLD AUTO: 7.28 K/UL (ref 3.9–12.7)

## 2021-09-23 PROCEDURE — 85025 COMPLETE CBC W/AUTO DIFF WBC: CPT | Performed by: INTERNAL MEDICINE

## 2021-09-23 PROCEDURE — 80053 COMPREHEN METABOLIC PANEL: CPT | Performed by: INTERNAL MEDICINE

## 2021-09-23 PROCEDURE — 86304 IMMUNOASSAY TUMOR CA 125: CPT | Performed by: INTERNAL MEDICINE

## 2021-09-23 PROCEDURE — 36415 COLL VENOUS BLD VENIPUNCTURE: CPT | Performed by: INTERNAL MEDICINE

## 2021-09-23 PROCEDURE — 82378 CARCINOEMBRYONIC ANTIGEN: CPT | Performed by: INTERNAL MEDICINE

## 2021-09-29 ENCOUNTER — OFFICE VISIT (OUTPATIENT)
Dept: HEMATOLOGY/ONCOLOGY | Facility: CLINIC | Age: 56
End: 2021-09-29
Payer: COMMERCIAL

## 2021-09-29 VITALS
OXYGEN SATURATION: 98 % | BODY MASS INDEX: 47.09 KG/M2 | TEMPERATURE: 98 F | WEIGHT: 293 LBS | HEIGHT: 66 IN | HEART RATE: 89 BPM | DIASTOLIC BLOOD PRESSURE: 74 MMHG | SYSTOLIC BLOOD PRESSURE: 169 MMHG

## 2021-09-29 DIAGNOSIS — Z85.3 PERSONAL HISTORY OF MALIGNANT NEOPLASM OF BREAST: Primary | ICD-10-CM

## 2021-09-29 DIAGNOSIS — R74.8 ELEVATED LIVER ENZYMES: ICD-10-CM

## 2021-09-29 PROCEDURE — 99999 PR PBB SHADOW E&M-EST. PATIENT-LVL IV: CPT | Mod: PBBFAC,,, | Performed by: INTERNAL MEDICINE

## 2021-09-29 PROCEDURE — 3078F PR MOST RECENT DIASTOLIC BLOOD PRESSURE < 80 MM HG: ICD-10-PCS | Mod: CPTII,S$GLB,, | Performed by: INTERNAL MEDICINE

## 2021-09-29 PROCEDURE — 99214 OFFICE O/P EST MOD 30 MIN: CPT | Mod: S$GLB,,, | Performed by: INTERNAL MEDICINE

## 2021-09-29 PROCEDURE — 3078F DIAST BP <80 MM HG: CPT | Mod: CPTII,S$GLB,, | Performed by: INTERNAL MEDICINE

## 2021-09-29 PROCEDURE — 3077F PR MOST RECENT SYSTOLIC BLOOD PRESSURE >= 140 MM HG: ICD-10-PCS | Mod: CPTII,S$GLB,, | Performed by: INTERNAL MEDICINE

## 2021-09-29 PROCEDURE — 1159F PR MEDICATION LIST DOCUMENTED IN MEDICAL RECORD: ICD-10-PCS | Mod: CPTII,S$GLB,, | Performed by: INTERNAL MEDICINE

## 2021-09-29 PROCEDURE — 3077F SYST BP >= 140 MM HG: CPT | Mod: CPTII,S$GLB,, | Performed by: INTERNAL MEDICINE

## 2021-09-29 PROCEDURE — 1159F MED LIST DOCD IN RCRD: CPT | Mod: CPTII,S$GLB,, | Performed by: INTERNAL MEDICINE

## 2021-09-29 PROCEDURE — 3008F PR BODY MASS INDEX (BMI) DOCUMENTED: ICD-10-PCS | Mod: CPTII,S$GLB,, | Performed by: INTERNAL MEDICINE

## 2021-09-29 PROCEDURE — 3008F BODY MASS INDEX DOCD: CPT | Mod: CPTII,S$GLB,, | Performed by: INTERNAL MEDICINE

## 2021-09-29 PROCEDURE — 99999 PR PBB SHADOW E&M-EST. PATIENT-LVL IV: ICD-10-PCS | Mod: PBBFAC,,, | Performed by: INTERNAL MEDICINE

## 2021-09-29 PROCEDURE — 99214 PR OFFICE/OUTPT VISIT, EST, LEVL IV, 30-39 MIN: ICD-10-PCS | Mod: S$GLB,,, | Performed by: INTERNAL MEDICINE

## 2021-10-15 ENCOUNTER — TELEPHONE (OUTPATIENT)
Dept: HEMATOLOGY/ONCOLOGY | Facility: CLINIC | Age: 56
End: 2021-10-15

## 2021-10-19 DIAGNOSIS — R74.8 ELEVATED LIVER ENZYMES: ICD-10-CM

## 2021-10-19 DIAGNOSIS — C50.912 MALIGNANT NEOPLASM OF LEFT BREAST IN FEMALE, ESTROGEN RECEPTOR POSITIVE, UNSPECIFIED SITE OF BREAST: Primary | ICD-10-CM

## 2021-10-19 DIAGNOSIS — Z17.0 MALIGNANT NEOPLASM OF LEFT BREAST IN FEMALE, ESTROGEN RECEPTOR POSITIVE, UNSPECIFIED SITE OF BREAST: Primary | ICD-10-CM

## 2021-10-20 ENCOUNTER — TELEPHONE (OUTPATIENT)
Dept: HEMATOLOGY/ONCOLOGY | Facility: CLINIC | Age: 56
End: 2021-10-20

## 2021-10-22 ENCOUNTER — TELEPHONE (OUTPATIENT)
Dept: HEMATOLOGY/ONCOLOGY | Facility: CLINIC | Age: 56
End: 2021-10-22

## 2021-10-22 DIAGNOSIS — Z17.0 MALIGNANT NEOPLASM OF LEFT BREAST IN FEMALE, ESTROGEN RECEPTOR POSITIVE, UNSPECIFIED SITE OF BREAST: Primary | ICD-10-CM

## 2021-10-22 DIAGNOSIS — C50.912 MALIGNANT NEOPLASM OF LEFT BREAST IN FEMALE, ESTROGEN RECEPTOR POSITIVE, UNSPECIFIED SITE OF BREAST: Primary | ICD-10-CM

## 2021-10-25 ENCOUNTER — HOSPITAL ENCOUNTER (OUTPATIENT)
Dept: RADIOLOGY | Facility: HOSPITAL | Age: 56
Discharge: HOME | End: 2021-10-25
Attending: INTERNAL MEDICINE
Payer: COMMERCIAL

## 2021-10-25 DIAGNOSIS — C50.912 MALIGNANT NEOPLASM OF LEFT BREAST IN FEMALE, ESTROGEN RECEPTOR POSITIVE, UNSPECIFIED SITE OF BREAST: ICD-10-CM

## 2021-10-25 DIAGNOSIS — Z17.0 MALIGNANT NEOPLASM OF LEFT BREAST IN FEMALE, ESTROGEN RECEPTOR POSITIVE, UNSPECIFIED SITE OF BREAST: ICD-10-CM

## 2021-10-25 DIAGNOSIS — R74.8 ELEVATED LIVER ENZYMES: ICD-10-CM

## 2021-10-25 PROCEDURE — 74177 CT ABD & PELVIS W/CONTRAST: CPT | Mod: 26,,, | Performed by: RADIOLOGY

## 2021-10-25 PROCEDURE — 74177 CT ABD & PELVIS W/CONTRAST: CPT | Mod: TC

## 2021-10-25 PROCEDURE — 74177 CT ABDOMEN PELVIS WITH CONTRAST: ICD-10-PCS | Mod: 26,,, | Performed by: RADIOLOGY

## 2021-10-25 PROCEDURE — 25500020 PHARM REV CODE 255: Performed by: INTERNAL MEDICINE

## 2021-10-25 RX ADMIN — IOHEXOL 100 ML: 350 INJECTION, SOLUTION INTRAVENOUS at 09:10

## 2021-10-25 RX ADMIN — IOHEXOL 15 ML: 300 INJECTION, SOLUTION INTRAVENOUS at 07:10

## 2021-10-28 ENCOUNTER — TELEPHONE (OUTPATIENT)
Dept: HEMATOLOGY/ONCOLOGY | Facility: CLINIC | Age: 56
End: 2021-10-28
Payer: COMMERCIAL

## 2021-10-28 ENCOUNTER — OFFICE VISIT (OUTPATIENT)
Dept: HEMATOLOGY/ONCOLOGY | Facility: CLINIC | Age: 56
End: 2021-10-28
Payer: COMMERCIAL

## 2021-10-28 VITALS
DIASTOLIC BLOOD PRESSURE: 70 MMHG | SYSTOLIC BLOOD PRESSURE: 151 MMHG | BODY MASS INDEX: 47.09 KG/M2 | HEART RATE: 91 BPM | WEIGHT: 293 LBS | HEIGHT: 66 IN

## 2021-10-28 DIAGNOSIS — Z85.3 PERSONAL HISTORY OF MALIGNANT NEOPLASM OF BREAST: Primary | ICD-10-CM

## 2021-10-28 DIAGNOSIS — R74.8 ELEVATED LIVER ENZYMES: ICD-10-CM

## 2021-10-28 DIAGNOSIS — K76.0 FATTY LIVER: ICD-10-CM

## 2021-10-28 PROCEDURE — 3077F PR MOST RECENT SYSTOLIC BLOOD PRESSURE >= 140 MM HG: ICD-10-PCS | Mod: CPTII,S$GLB,, | Performed by: INTERNAL MEDICINE

## 2021-10-28 PROCEDURE — 99999 PR PBB SHADOW E&M-EST. PATIENT-LVL IV: ICD-10-PCS | Mod: PBBFAC,,, | Performed by: INTERNAL MEDICINE

## 2021-10-28 PROCEDURE — 99214 OFFICE O/P EST MOD 30 MIN: CPT | Mod: S$GLB,,, | Performed by: INTERNAL MEDICINE

## 2021-10-28 PROCEDURE — 1159F MED LIST DOCD IN RCRD: CPT | Mod: CPTII,S$GLB,, | Performed by: INTERNAL MEDICINE

## 2021-10-28 PROCEDURE — 1159F PR MEDICATION LIST DOCUMENTED IN MEDICAL RECORD: ICD-10-PCS | Mod: CPTII,S$GLB,, | Performed by: INTERNAL MEDICINE

## 2021-10-28 PROCEDURE — 3008F PR BODY MASS INDEX (BMI) DOCUMENTED: ICD-10-PCS | Mod: CPTII,S$GLB,, | Performed by: INTERNAL MEDICINE

## 2021-10-28 PROCEDURE — 99214 PR OFFICE/OUTPT VISIT, EST, LEVL IV, 30-39 MIN: ICD-10-PCS | Mod: S$GLB,,, | Performed by: INTERNAL MEDICINE

## 2021-10-28 PROCEDURE — 3078F PR MOST RECENT DIASTOLIC BLOOD PRESSURE < 80 MM HG: ICD-10-PCS | Mod: CPTII,S$GLB,, | Performed by: INTERNAL MEDICINE

## 2021-10-28 PROCEDURE — 3078F DIAST BP <80 MM HG: CPT | Mod: CPTII,S$GLB,, | Performed by: INTERNAL MEDICINE

## 2021-10-28 PROCEDURE — 3008F BODY MASS INDEX DOCD: CPT | Mod: CPTII,S$GLB,, | Performed by: INTERNAL MEDICINE

## 2021-10-28 PROCEDURE — 99999 PR PBB SHADOW E&M-EST. PATIENT-LVL IV: CPT | Mod: PBBFAC,,, | Performed by: INTERNAL MEDICINE

## 2021-10-28 PROCEDURE — 3077F SYST BP >= 140 MM HG: CPT | Mod: CPTII,S$GLB,, | Performed by: INTERNAL MEDICINE

## 2021-11-12 ENCOUNTER — OFFICE VISIT (OUTPATIENT)
Dept: HEPATOLOGY | Facility: CLINIC | Age: 56
End: 2021-11-12
Payer: COMMERCIAL

## 2021-11-12 VITALS
WEIGHT: 293 LBS | OXYGEN SATURATION: 97 % | SYSTOLIC BLOOD PRESSURE: 146 MMHG | BODY MASS INDEX: 49.07 KG/M2 | TEMPERATURE: 98 F | HEART RATE: 87 BPM | DIASTOLIC BLOOD PRESSURE: 70 MMHG

## 2021-11-12 DIAGNOSIS — E66.01 MORBID OBESITY: ICD-10-CM

## 2021-11-12 DIAGNOSIS — E78.5 HYPERLIPIDEMIA LDL GOAL <130: ICD-10-CM

## 2021-11-12 DIAGNOSIS — R74.8 ELEVATED LIVER ENZYMES: ICD-10-CM

## 2021-11-12 DIAGNOSIS — K76.0 NAFLD (NONALCOHOLIC FATTY LIVER DISEASE): Primary | ICD-10-CM

## 2021-11-12 PROCEDURE — 99204 OFFICE O/P NEW MOD 45 MIN: CPT | Mod: S$GLB,,, | Performed by: NURSE PRACTITIONER

## 2021-11-12 PROCEDURE — 99204 PR OFFICE/OUTPT VISIT, NEW, LEVL IV, 45-59 MIN: ICD-10-PCS | Mod: S$GLB,,, | Performed by: NURSE PRACTITIONER

## 2021-11-12 PROCEDURE — 99999 PR PBB SHADOW E&M-EST. PATIENT-LVL V: ICD-10-PCS | Mod: PBBFAC,,, | Performed by: NURSE PRACTITIONER

## 2021-11-12 PROCEDURE — 3078F DIAST BP <80 MM HG: CPT | Mod: CPTII,S$GLB,, | Performed by: NURSE PRACTITIONER

## 2021-11-12 PROCEDURE — 99999 PR PBB SHADOW E&M-EST. PATIENT-LVL V: CPT | Mod: PBBFAC,,, | Performed by: NURSE PRACTITIONER

## 2021-11-12 PROCEDURE — 1160F RVW MEDS BY RX/DR IN RCRD: CPT | Mod: CPTII,S$GLB,, | Performed by: NURSE PRACTITIONER

## 2021-11-12 PROCEDURE — 1159F PR MEDICATION LIST DOCUMENTED IN MEDICAL RECORD: ICD-10-PCS | Mod: CPTII,S$GLB,, | Performed by: NURSE PRACTITIONER

## 2021-11-12 PROCEDURE — 1160F PR REVIEW ALL MEDS BY PRESCRIBER/CLIN PHARMACIST DOCUMENTED: ICD-10-PCS | Mod: CPTII,S$GLB,, | Performed by: NURSE PRACTITIONER

## 2021-11-12 PROCEDURE — 3008F PR BODY MASS INDEX (BMI) DOCUMENTED: ICD-10-PCS | Mod: CPTII,S$GLB,, | Performed by: NURSE PRACTITIONER

## 2021-11-12 PROCEDURE — 1159F MED LIST DOCD IN RCRD: CPT | Mod: CPTII,S$GLB,, | Performed by: NURSE PRACTITIONER

## 2021-11-12 PROCEDURE — 3077F SYST BP >= 140 MM HG: CPT | Mod: CPTII,S$GLB,, | Performed by: NURSE PRACTITIONER

## 2021-11-12 PROCEDURE — 3077F PR MOST RECENT SYSTOLIC BLOOD PRESSURE >= 140 MM HG: ICD-10-PCS | Mod: CPTII,S$GLB,, | Performed by: NURSE PRACTITIONER

## 2021-11-12 PROCEDURE — 3008F BODY MASS INDEX DOCD: CPT | Mod: CPTII,S$GLB,, | Performed by: NURSE PRACTITIONER

## 2021-11-12 PROCEDURE — 3078F PR MOST RECENT DIASTOLIC BLOOD PRESSURE < 80 MM HG: ICD-10-PCS | Mod: CPTII,S$GLB,, | Performed by: NURSE PRACTITIONER

## 2021-11-12 RX ORDER — CELECOXIB 200 MG/1
CAPSULE ORAL
COMMUNITY
Start: 2021-09-23 | End: 2022-08-09 | Stop reason: SDUPTHER

## 2021-11-12 RX ORDER — ERGOCALCIFEROL 1.25 MG/1
CAPSULE ORAL
COMMUNITY
Start: 2021-10-19 | End: 2022-05-11 | Stop reason: SDUPTHER

## 2021-11-12 RX ORDER — METHOCARBAMOL 750 MG/1
TABLET, FILM COATED ORAL
COMMUNITY
Start: 2021-10-19 | End: 2022-10-25

## 2021-11-14 PROBLEM — R42 VERTIGO: Status: ACTIVE | Noted: 2021-11-14

## 2021-11-14 PROBLEM — I89.0 LYMPHEDEMA: Status: ACTIVE | Noted: 2021-11-14

## 2021-11-14 PROBLEM — H47.13 PAPILLEDEMA ASSOCIATED WITH RETINAL DISORDER: Status: ACTIVE | Noted: 2021-11-14

## 2021-11-14 PROBLEM — H47.019 ISCHEMIC OPTIC NEUROPATHY: Status: ACTIVE | Noted: 2021-11-14

## 2021-11-14 PROBLEM — I89.0 LYMPHEDEMA: Status: RESOLVED | Noted: 2021-11-14 | Resolved: 2021-11-14

## 2021-11-14 PROBLEM — E79.0 HYPERURICEMIA WITHOUT SIGNS OF INFLAMMATORY ARTHRITIS AND TOPHACEOUS DISEASE: Status: ACTIVE | Noted: 2021-11-14

## 2021-11-14 PROBLEM — K76.0 NAFLD (NONALCOHOLIC FATTY LIVER DISEASE): Status: ACTIVE | Noted: 2021-11-14

## 2021-11-14 PROBLEM — I11.9 HYPERTENSIVE HEART DISEASE: Status: ACTIVE | Noted: 2021-11-14

## 2021-11-14 PROBLEM — Z85.3 PERSONAL HISTORY OF MALIGNANT NEOPLASM OF BREAST: Status: RESOLVED | Noted: 2020-11-10 | Resolved: 2021-11-14

## 2021-11-14 PROBLEM — G47.33 OBSTRUCTIVE SLEEP APNEA SYNDROME: Status: ACTIVE | Noted: 2021-11-14

## 2021-11-17 ENCOUNTER — TELEPHONE (OUTPATIENT)
Dept: BARIATRICS | Facility: CLINIC | Age: 56
End: 2021-11-17
Payer: COMMERCIAL

## 2021-12-01 ENCOUNTER — HOSPITAL ENCOUNTER (OUTPATIENT)
Dept: RADIOLOGY | Facility: HOSPITAL | Age: 56
Discharge: HOME | End: 2021-12-01
Attending: NURSE PRACTITIONER
Payer: COMMERCIAL

## 2021-12-01 DIAGNOSIS — K76.0 NAFLD (NONALCOHOLIC FATTY LIVER DISEASE): ICD-10-CM

## 2021-12-01 PROCEDURE — 76391 MR ELASTOGRAPHY: CPT | Mod: TC

## 2021-12-01 PROCEDURE — 76391 MR ELASTOGRAPHY: ICD-10-PCS | Mod: 26,,, | Performed by: RADIOLOGY

## 2021-12-01 PROCEDURE — 76391 MR ELASTOGRAPHY: CPT | Mod: 26,,, | Performed by: RADIOLOGY

## 2021-12-08 ENCOUNTER — OFFICE VISIT (OUTPATIENT)
Dept: HEPATOLOGY | Facility: CLINIC | Age: 56
End: 2021-12-08
Payer: COMMERCIAL

## 2021-12-08 ENCOUNTER — TELEPHONE (OUTPATIENT)
Dept: HEPATOLOGY | Facility: CLINIC | Age: 56
End: 2021-12-08

## 2021-12-08 DIAGNOSIS — K74.01 HEPATIC FIBROSIS, EARLY FIBROSIS: ICD-10-CM

## 2021-12-08 DIAGNOSIS — E78.5 HYPERLIPIDEMIA LDL GOAL <130: ICD-10-CM

## 2021-12-08 DIAGNOSIS — K76.0 NAFLD (NONALCOHOLIC FATTY LIVER DISEASE): Primary | ICD-10-CM

## 2021-12-08 DIAGNOSIS — Z23 NEED FOR HEPATITIS A AND B VACCINATION: ICD-10-CM

## 2021-12-08 DIAGNOSIS — E88.01 LOW PLASMA ALPHA-1 ANTITRYPSIN: ICD-10-CM

## 2021-12-08 DIAGNOSIS — R74.8 ELEVATED LIVER ENZYMES: ICD-10-CM

## 2021-12-08 DIAGNOSIS — I11.9 HYPERTENSIVE HEART DISEASE, UNSPECIFIED WHETHER HEART FAILURE PRESENT: ICD-10-CM

## 2021-12-08 DIAGNOSIS — E66.01 MORBID OBESITY: ICD-10-CM

## 2021-12-08 PROCEDURE — 99214 OFFICE O/P EST MOD 30 MIN: CPT | Mod: 95,,, | Performed by: NURSE PRACTITIONER

## 2021-12-08 PROCEDURE — 99214 PR OFFICE/OUTPT VISIT, EST, LEVL IV, 30-39 MIN: ICD-10-PCS | Mod: 95,,, | Performed by: NURSE PRACTITIONER

## 2021-12-14 ENCOUNTER — TELEPHONE (OUTPATIENT)
Dept: BARIATRICS | Facility: CLINIC | Age: 56
End: 2021-12-14
Payer: COMMERCIAL

## 2021-12-16 ENCOUNTER — TELEPHONE (OUTPATIENT)
Dept: BARIATRICS | Facility: CLINIC | Age: 56
End: 2021-12-16
Payer: COMMERCIAL

## 2022-01-11 ENCOUNTER — PATIENT MESSAGE (OUTPATIENT)
Dept: BARIATRICS | Facility: CLINIC | Age: 57
End: 2022-01-11
Payer: COMMERCIAL

## 2022-02-02 ENCOUNTER — TELEPHONE (OUTPATIENT)
Dept: BARIATRICS | Facility: CLINIC | Age: 57
End: 2022-02-02
Payer: COMMERCIAL

## 2022-05-11 ENCOUNTER — OFFICE VISIT (OUTPATIENT)
Dept: FAMILY MEDICINE | Facility: CLINIC | Age: 57
End: 2022-05-11
Payer: COMMERCIAL

## 2022-05-11 VITALS
HEART RATE: 74 BPM | WEIGHT: 293 LBS | TEMPERATURE: 98 F | HEIGHT: 66 IN | SYSTOLIC BLOOD PRESSURE: 128 MMHG | BODY MASS INDEX: 47.09 KG/M2 | DIASTOLIC BLOOD PRESSURE: 72 MMHG | OXYGEN SATURATION: 98 %

## 2022-05-11 DIAGNOSIS — Z23 NEED FOR HEPATITIS A VACCINATION: ICD-10-CM

## 2022-05-11 DIAGNOSIS — G47.33 OBSTRUCTIVE SLEEP APNEA SYNDROME: ICD-10-CM

## 2022-05-11 DIAGNOSIS — K76.0 NAFLD (NONALCOHOLIC FATTY LIVER DISEASE): ICD-10-CM

## 2022-05-11 DIAGNOSIS — E78.5 HYPERLIPIDEMIA LDL GOAL <130: ICD-10-CM

## 2022-05-11 DIAGNOSIS — E66.01 MORBID OBESITY: ICD-10-CM

## 2022-05-11 DIAGNOSIS — F32.A CHRONIC DEPRESSION: ICD-10-CM

## 2022-05-11 DIAGNOSIS — I89.0 LYMPHEDEMA OF ARM: ICD-10-CM

## 2022-05-11 DIAGNOSIS — Z23 NEED FOR HEPATITIS B VACCINATION: Primary | ICD-10-CM

## 2022-05-11 DIAGNOSIS — E79.0 HYPERURICEMIA WITHOUT SIGNS OF INFLAMMATORY ARTHRITIS AND TOPHACEOUS DISEASE: ICD-10-CM

## 2022-05-11 DIAGNOSIS — G47.01 INSOMNIA DUE TO MEDICAL CONDITION: ICD-10-CM

## 2022-05-11 DIAGNOSIS — I10 BENIGN HYPERTENSION: ICD-10-CM

## 2022-05-11 PROBLEM — R42 VERTIGO: Status: RESOLVED | Noted: 2021-11-14 | Resolved: 2022-05-11

## 2022-05-11 PROCEDURE — 4010F PR ACE/ARB THEARPY RXD/TAKEN: ICD-10-PCS | Mod: CPTII,S$GLB,, | Performed by: STUDENT IN AN ORGANIZED HEALTH CARE EDUCATION/TRAINING PROGRAM

## 2022-05-11 PROCEDURE — 90471 IMMUNIZATION ADMIN: CPT | Mod: S$GLB,,, | Performed by: STUDENT IN AN ORGANIZED HEALTH CARE EDUCATION/TRAINING PROGRAM

## 2022-05-11 PROCEDURE — 99999 PR PBB SHADOW E&M-EST. PATIENT-LVL IV: CPT | Mod: PBBFAC,,, | Performed by: STUDENT IN AN ORGANIZED HEALTH CARE EDUCATION/TRAINING PROGRAM

## 2022-05-11 PROCEDURE — 3008F PR BODY MASS INDEX (BMI) DOCUMENTED: ICD-10-PCS | Mod: CPTII,S$GLB,, | Performed by: STUDENT IN AN ORGANIZED HEALTH CARE EDUCATION/TRAINING PROGRAM

## 2022-05-11 PROCEDURE — 1159F MED LIST DOCD IN RCRD: CPT | Mod: CPTII,S$GLB,, | Performed by: STUDENT IN AN ORGANIZED HEALTH CARE EDUCATION/TRAINING PROGRAM

## 2022-05-11 PROCEDURE — 90472 IMMUNIZATION ADMIN EACH ADD: CPT | Mod: S$GLB,,, | Performed by: STUDENT IN AN ORGANIZED HEALTH CARE EDUCATION/TRAINING PROGRAM

## 2022-05-11 PROCEDURE — 3008F BODY MASS INDEX DOCD: CPT | Mod: CPTII,S$GLB,, | Performed by: STUDENT IN AN ORGANIZED HEALTH CARE EDUCATION/TRAINING PROGRAM

## 2022-05-11 PROCEDURE — 3078F PR MOST RECENT DIASTOLIC BLOOD PRESSURE < 80 MM HG: ICD-10-PCS | Mod: CPTII,S$GLB,, | Performed by: STUDENT IN AN ORGANIZED HEALTH CARE EDUCATION/TRAINING PROGRAM

## 2022-05-11 PROCEDURE — 90471 HEPATITIS B (RECOMBINANT) ADJUVANTED, 2 DOSE: ICD-10-PCS | Mod: S$GLB,,, | Performed by: STUDENT IN AN ORGANIZED HEALTH CARE EDUCATION/TRAINING PROGRAM

## 2022-05-11 PROCEDURE — 3074F PR MOST RECENT SYSTOLIC BLOOD PRESSURE < 130 MM HG: ICD-10-PCS | Mod: CPTII,S$GLB,, | Performed by: STUDENT IN AN ORGANIZED HEALTH CARE EDUCATION/TRAINING PROGRAM

## 2022-05-11 PROCEDURE — 90739 HEPATITIS B (RECOMBINANT) ADJUVANTED, 2 DOSE: ICD-10-PCS | Mod: S$GLB,,, | Performed by: STUDENT IN AN ORGANIZED HEALTH CARE EDUCATION/TRAINING PROGRAM

## 2022-05-11 PROCEDURE — 99203 OFFICE O/P NEW LOW 30 MIN: CPT | Mod: 25,S$GLB,, | Performed by: STUDENT IN AN ORGANIZED HEALTH CARE EDUCATION/TRAINING PROGRAM

## 2022-05-11 PROCEDURE — 90739 HEPB VACC 2/4 DOSE ADULT IM: CPT | Mod: S$GLB,,, | Performed by: STUDENT IN AN ORGANIZED HEALTH CARE EDUCATION/TRAINING PROGRAM

## 2022-05-11 PROCEDURE — 99999 PR PBB SHADOW E&M-EST. PATIENT-LVL IV: ICD-10-PCS | Mod: PBBFAC,,, | Performed by: STUDENT IN AN ORGANIZED HEALTH CARE EDUCATION/TRAINING PROGRAM

## 2022-05-11 PROCEDURE — 1159F PR MEDICATION LIST DOCUMENTED IN MEDICAL RECORD: ICD-10-PCS | Mod: CPTII,S$GLB,, | Performed by: STUDENT IN AN ORGANIZED HEALTH CARE EDUCATION/TRAINING PROGRAM

## 2022-05-11 PROCEDURE — 90632 HEPA VACCINE ADULT IM: CPT | Mod: S$GLB,,, | Performed by: STUDENT IN AN ORGANIZED HEALTH CARE EDUCATION/TRAINING PROGRAM

## 2022-05-11 PROCEDURE — 99203 PR OFFICE/OUTPT VISIT, NEW, LEVL III, 30-44 MIN: ICD-10-PCS | Mod: 25,S$GLB,, | Performed by: STUDENT IN AN ORGANIZED HEALTH CARE EDUCATION/TRAINING PROGRAM

## 2022-05-11 PROCEDURE — 90632 HEPATITIS A VACCINE ADULT IM: ICD-10-PCS | Mod: S$GLB,,, | Performed by: STUDENT IN AN ORGANIZED HEALTH CARE EDUCATION/TRAINING PROGRAM

## 2022-05-11 PROCEDURE — 4010F ACE/ARB THERAPY RXD/TAKEN: CPT | Mod: CPTII,S$GLB,, | Performed by: STUDENT IN AN ORGANIZED HEALTH CARE EDUCATION/TRAINING PROGRAM

## 2022-05-11 PROCEDURE — 90472 HEPATITIS A VACCINE ADULT IM: ICD-10-PCS | Mod: S$GLB,,, | Performed by: STUDENT IN AN ORGANIZED HEALTH CARE EDUCATION/TRAINING PROGRAM

## 2022-05-11 PROCEDURE — 3074F SYST BP LT 130 MM HG: CPT | Mod: CPTII,S$GLB,, | Performed by: STUDENT IN AN ORGANIZED HEALTH CARE EDUCATION/TRAINING PROGRAM

## 2022-05-11 PROCEDURE — 3078F DIAST BP <80 MM HG: CPT | Mod: CPTII,S$GLB,, | Performed by: STUDENT IN AN ORGANIZED HEALTH CARE EDUCATION/TRAINING PROGRAM

## 2022-05-11 RX ORDER — LATANOPROST 50 UG/ML
1 SOLUTION/ DROPS OPHTHALMIC NIGHTLY
COMMUNITY
Start: 2022-05-03 | End: 2022-08-09

## 2022-05-11 RX ORDER — AMLODIPINE BESYLATE 5 MG/1
5 TABLET ORAL 2 TIMES DAILY
Start: 2022-05-11 | End: 2022-08-09 | Stop reason: SDUPTHER

## 2022-05-11 RX ORDER — AMOXICILLIN AND CLAVULANATE POTASSIUM 875; 125 MG/1; MG/1
TABLET, FILM COATED ORAL
COMMUNITY
Start: 2022-01-11 | End: 2022-05-11 | Stop reason: ALTCHOICE

## 2022-05-11 RX ORDER — ESCITALOPRAM OXALATE 10 MG/1
TABLET ORAL
COMMUNITY
Start: 2022-04-18 | End: 2022-08-09 | Stop reason: SDUPTHER

## 2022-05-11 RX ORDER — VALSARTAN 320 MG/1
320 TABLET ORAL DAILY
COMMUNITY
Start: 2022-02-19 | End: 2023-04-14

## 2022-05-11 RX ORDER — IPRATROPIUM BROMIDE 42 UG/1
SPRAY, METERED NASAL
COMMUNITY
Start: 2022-02-18 | End: 2022-05-11 | Stop reason: SDUPTHER

## 2022-05-11 RX ORDER — VALSARTAN 320 MG/1
320 TABLET ORAL
COMMUNITY
Start: 2022-02-19 | End: 2022-05-11 | Stop reason: SDUPTHER

## 2022-05-11 RX ORDER — KETOCONAZOLE 20 MG/ML
1 SHAMPOO, SUSPENSION TOPICAL
COMMUNITY
Start: 2021-11-18 | End: 2022-12-10

## 2022-05-11 RX ORDER — MINOXIDIL 2.5 MG/1
2.5 TABLET ORAL DAILY PRN
COMMUNITY
Start: 2022-05-01 | End: 2022-06-09 | Stop reason: SDUPTHER

## 2022-05-11 RX ORDER — AMLODIPINE BESYLATE 5 MG/1
5 TABLET ORAL
COMMUNITY
Start: 2022-03-24 | End: 2022-05-11

## 2022-05-11 RX ORDER — IPRATROPIUM BROMIDE 42 UG/1
2 SPRAY, METERED NASAL 2 TIMES DAILY
COMMUNITY
Start: 2022-04-16 | End: 2022-08-09 | Stop reason: SDUPTHER

## 2022-05-11 RX ORDER — LATANOPROST 50 UG/ML
SOLUTION/ DROPS OPHTHALMIC
COMMUNITY
Start: 2022-03-28 | End: 2023-11-14

## 2022-05-11 RX ORDER — LOSARTAN POTASSIUM 100 MG/1
1 TABLET ORAL DAILY
COMMUNITY
Start: 2022-03-21 | End: 2022-05-11 | Stop reason: ALTCHOICE

## 2022-05-11 RX ORDER — ESCITALOPRAM OXALATE 10 MG/1
TABLET ORAL
COMMUNITY
Start: 2022-03-22 | End: 2022-05-11 | Stop reason: SDUPTHER

## 2022-05-11 RX ORDER — ERGOCALCIFEROL 1.25 MG/1
50000 CAPSULE ORAL
COMMUNITY
Start: 2022-04-18 | End: 2022-08-09 | Stop reason: SDUPTHER

## 2022-05-11 RX ORDER — CETIRIZINE HYDROCHLORIDE 10 MG/1
10 TABLET ORAL DAILY
COMMUNITY

## 2022-05-11 NOTE — PROGRESS NOTES
2022    Margie Cifuentes  [id number]    Chief Complaint   Patient presents with    Establish Care       [facility]    This patient is new to me. Pt presents to establish care to manage her chronic conditions as listed below. No acute concerns at this time    HTN  Amlodipine 5 mg BID,  valsartan 320 mg    Depression: lexapro 10 mg, lorazepam 1 mg( nightly since cancer dx)    Hx of breast CA: s/p chemo radiation and reconstruction    Multiple bulging disc with chronic pain on celebrex, and muscle relaxers    Hx of oophrectomy 2/2 cyst    JOSEPHINE on cpap    Right eye blindness 2/2 to stroke    Severe obesity with serious comorbidty: will be meeting with bariatric medicine    NAFLD: follows yearly with hepatology. States that she needs hep a and b vaccination    HM: likely up to date. Has requested records    Negative outside of HPI    Past Medical History:   Diagnosis Date    B12 deficiency anemia     Intramuscular replacement    Benign hypertension     Breast cancer     Left-sided diagnosed  status post mastectomy chemotherapy and radiation, focal atypical ductal hyperplasia of the right status post mastectomy 2009    Chronic depression     GERD (gastroesophageal reflux disease)     Hypercalcemia syndrome     Hyperlipidemia LDL goal <130     Hyperparathyroidism due to vitamin D deficiency     Insomnia     Lumbar radiculitis     Lymphedema of arm     Morbid obesity     Vitamin D deficiency disease        Past Surgical History:   Procedure Laterality Date    BREAST RECONSTRUCTION      Left and right     x2      Debridement of both breasts      2009    Debridement of leg      2009    EYE MUSCLE SURGERY      Left mastectomy          Leg grafting      Left leg 2009, donor site right leg    LUMBAR SPINE SURGERY      2008    Right mastectomy      2009    TUBAL LIGATION         Family History   Problem Relation Age of Onset    COPD Mother     Heart disease Father     Hypertension  Father     Hyperlipidemia Father     COPD Sister     Stroke Brother     Cirrhosis Neg Hx        Social History     Socioeconomic History    Marital status:     Number of children: 2    Years of education: [employer]   Occupational History    Occupation: Works for a [employer]     Employer: [employer]   Tobacco Use    Smoking status: Never Smoker    Smokeless tobacco: Never Used   Substance and Sexual Activity    Alcohol use: No    Drug use: No    Sexual activity: Yes     Partners: Male   Social History Narrative    Adult Screenings updated and reviewed  4/10/14    Mammogram( for females) history of bilateral mastectomy    Pap ( for females)- years      Colonoscopy - done 2006 normal    Flu shot - 2013    Td 2005    Pneumovax updated 4/10/14 due again  2019    Zostavax recommended one time at  age  59    Eye exam  Overdue     Bone density overdue          Current Outpatient Medications:     allopurinol (ZYLOPRIM) 100 MG tablet, Take 100 mg by mouth once daily., Disp: , Rfl:     amLODIPine (NORVASC) 5 MG tablet, Take 5 mg by mouth once daily., Disp: , Rfl:     amLODIPine (NORVASC) 5 MG tablet, Take 5 mg by mouth., Disp: , Rfl:     celecoxib (CELEBREX) 200 MG capsule, Take by mouth., Disp: , Rfl:     ergocalciferol (ERGOCALCIFEROL) 50,000 unit Cap, TAKE 1 CAPSULE BY MOUTH TWICE A WEEK ON MONDAY & FRIDAY., Disp: , Rfl:     EScitalopram oxalate (LEXAPRO) 10 MG tablet, TAKE 1/2 TABLET BY MOUTH AT NIGHT FOR ONE WEEK, THEN INCREASE TO ONE TABLET DAILY. MAY MOVE TO DAYTIME, Disp: , Rfl:     EScitalopram oxalate (LEXAPRO) 10 MG tablet, Take by mouth., Disp: , Rfl:     furosemide (LASIX) 40 MG tablet, Take 1 tablet (40 mg total) by mouth daily as needed. Take  with  Potassium, Disp: 30 tablet, Rfl: 6    ipratropium (ATROVENT) 42 mcg (0.06 %) nasal spray, spray TWO sprays IN EACH NOSTRIL TWICE DAILY; use in addition to flonase, Disp: , Rfl:     ipratropium (ATROVENT) 42 mcg (0.06 %) nasal  spray, 2 sprays 2 (two) times daily., Disp: , Rfl:     ketoconazole (NIZORAL) 2 % shampoo, Apply 1 application topically twice a week., Disp: , Rfl:     latanoprost 0.005 % ophthalmic solution, INSTILL ONE DROP IN EACH EYE EVERY NIGHT AT BEDTIME., Disp: , Rfl:     lorazepam (ATIVAN) 1 MG tablet, TAKE 1 TABLET BY MOUTH EVERY 8 HOURS AS NEEDED, Disp: 90 tablet, Rfl: 3    losartan (COZAAR) 100 MG tablet, Take 100 mg by mouth once daily., Disp: , Rfl:     losartan (COZAAR) 100 MG tablet, Take 1 tablet by mouth once daily., Disp: , Rfl:     methocarbamoL (ROBAXIN) 750 MG Tab, TAKE TWO TABLETS BY MOUTH EVERY 8 HOURS AS NEEDED FOR MUSCLE SPASMS., Disp: , Rfl:     minoxidiL (LONITEN) 2.5 MG tablet, Take 2.5 mg by mouth daily as needed., Disp: , Rfl:     valsartan (DIOVAN) 320 MG tablet, Take 320 mg by mouth., Disp: , Rfl:     valsartan (DIOVAN) 320 MG tablet, Take 320 mg by mouth once daily., Disp: , Rfl:     amoxicillin-clavulanate 875-125mg (AUGMENTIN) 875-125 mg per tablet, SMARTSI Tablet(s) By Mouth Every 12 Hours, Disp: , Rfl:     ergocalciferol (ERGOCALCIFEROL) 50,000 unit Cap, Take 50,000 Units by mouth., Disp: , Rfl:     latanoprost 0.005 % ophthalmic solution, Place 1 drop into both eyes nightly., Disp: , Rfl:         Vitals:    22 0757   BP: 128/72   Pulse: 74   Temp: 97.9 °F (36.6 °C)       PHYSICAL EXAM    GEN: NAD, AAox3, well nourished, obese  HEENT: NCAT, EOMI, PEERL, no scleral injection, TM normal, moist mucous membranes, oropharynx clear, no erythema, no exudates  NECK: full rom, no cervical lymphadenopathy, no thyroidmegally  CV: RRR, no m/r/g, bilateral LE with lymphedema  LUNGS: CTAB, non-labored breathing, no wheezes, no crackles  ABD: soft, non-distended, no rebound/guarding, no organomegaly  EXT: n c/c/e, warm, 5/5 UE and LE strength  NEURO: CNII-XII intact no focal deficit  PSYCH: nl affect, no hallucinations, nl speech  Skin: intact, no rashes/lesions/erythema      1. Need for  hepatitis B vaccination  - (In Office Administered) Hepatitis B (Recombinant) Adjuvanted, 2 dose    2. Need for hepatitis A vaccination  - (In Office Administered) Hepatitis A Vaccine (Adult) (IM)    3. Morbid obesity  Has been referred to bariatric medicine    4. Insomnia due to medical condition  Continue lorazepam    5. Hyperlipidemia LDL goal <130  Awaiting most recent labs    6. Benign hypertension    7. Lymphedema of arm  Continue current regimen    8. NAFLD (nonalcoholic fatty liver disease)  provided vaccination today    9. Obstructive sleep apnea syndrome  Continue cpap  10. Chronic depression  Continue current management: lexapro    11. Hyperuricemia without signs of inflammatory arthritis and tophaceous disease  Continue current management: allopurinol    Awaiting records for health maintenance    RTc in 6 months    Klaudia Fernandez MD  Family Medicine

## 2022-05-16 ENCOUNTER — PATIENT MESSAGE (OUTPATIENT)
Dept: ADMINISTRATIVE | Facility: HOSPITAL | Age: 57
End: 2022-05-16
Payer: COMMERCIAL

## 2022-05-18 DIAGNOSIS — Z12.11 COLON CANCER SCREENING: ICD-10-CM

## 2022-05-31 ENCOUNTER — PATIENT MESSAGE (OUTPATIENT)
Dept: ADMINISTRATIVE | Facility: HOSPITAL | Age: 57
End: 2022-05-31
Payer: COMMERCIAL

## 2022-05-31 ENCOUNTER — PATIENT OUTREACH (OUTPATIENT)
Dept: ADMINISTRATIVE | Facility: HOSPITAL | Age: 57
End: 2022-05-31
Payer: COMMERCIAL

## 2022-05-31 DIAGNOSIS — I10 PRIMARY HYPERTENSION: Primary | ICD-10-CM

## 2022-06-06 ENCOUNTER — PATIENT MESSAGE (OUTPATIENT)
Dept: FAMILY MEDICINE | Facility: CLINIC | Age: 57
End: 2022-06-06
Payer: COMMERCIAL

## 2022-06-06 RX ORDER — MINOXIDIL 2.5 MG/1
2.5 TABLET ORAL DAILY PRN
Status: CANCELLED | OUTPATIENT
Start: 2022-06-06

## 2022-06-09 DIAGNOSIS — I10 BENIGN HYPERTENSION: Primary | ICD-10-CM

## 2022-06-09 RX ORDER — MINOXIDIL 2.5 MG/1
2.5 TABLET ORAL DAILY
Qty: 90 TABLET | Refills: 3 | Status: SHIPPED | OUTPATIENT
Start: 2022-06-09 | End: 2024-01-04 | Stop reason: SDUPTHER

## 2022-06-10 ENCOUNTER — TELEPHONE (OUTPATIENT)
Dept: FAMILY MEDICINE | Facility: CLINIC | Age: 57
End: 2022-06-10
Payer: COMMERCIAL

## 2022-06-10 DIAGNOSIS — Z23 NEED FOR HEPATITIS B VACCINATION: Primary | ICD-10-CM

## 2022-06-13 ENCOUNTER — LAB VISIT (OUTPATIENT)
Dept: LAB | Facility: HOSPITAL | Age: 57
End: 2022-06-13
Attending: STUDENT IN AN ORGANIZED HEALTH CARE EDUCATION/TRAINING PROGRAM
Payer: COMMERCIAL

## 2022-06-13 ENCOUNTER — CLINICAL SUPPORT (OUTPATIENT)
Dept: FAMILY MEDICINE | Facility: CLINIC | Age: 57
End: 2022-06-13
Payer: COMMERCIAL

## 2022-06-13 DIAGNOSIS — Z23 NEED FOR HEPATITIS B VACCINATION: Primary | ICD-10-CM

## 2022-06-13 DIAGNOSIS — I10 PRIMARY HYPERTENSION: ICD-10-CM

## 2022-06-13 LAB
CHOLEST SERPL-MCNC: 185 MG/DL (ref 120–199)
CHOLEST/HDLC SERPL: 4.1 {RATIO} (ref 2–5)
ESTIMATED AVG GLUCOSE: 108 MG/DL (ref 68–131)
HBA1C MFR BLD: 5.4 % (ref 4–5.6)
HDLC SERPL-MCNC: 45 MG/DL (ref 40–75)
HDLC SERPL: 24.3 % (ref 20–50)
LDLC SERPL CALC-MCNC: 120.6 MG/DL (ref 63–159)
NONHDLC SERPL-MCNC: 140 MG/DL
TRIGL SERPL-MCNC: 97 MG/DL (ref 30–150)

## 2022-06-13 PROCEDURE — 90471 IMMUNIZATION ADMIN: CPT | Mod: S$GLB,,, | Performed by: STUDENT IN AN ORGANIZED HEALTH CARE EDUCATION/TRAINING PROGRAM

## 2022-06-13 PROCEDURE — 99999 PR PBB SHADOW E&M-EST. PATIENT-LVL I: ICD-10-PCS | Mod: PBBFAC,,,

## 2022-06-13 PROCEDURE — 99999 PR PBB SHADOW E&M-EST. PATIENT-LVL I: CPT | Mod: PBBFAC,,,

## 2022-06-13 PROCEDURE — 80061 LIPID PANEL: CPT | Performed by: STUDENT IN AN ORGANIZED HEALTH CARE EDUCATION/TRAINING PROGRAM

## 2022-06-13 PROCEDURE — 90471 HEPATITIS B (RECOMBINANT) ADJUVANTED, 2 DOSE: ICD-10-PCS | Mod: S$GLB,,, | Performed by: STUDENT IN AN ORGANIZED HEALTH CARE EDUCATION/TRAINING PROGRAM

## 2022-06-13 PROCEDURE — 90739 HEPATITIS B (RECOMBINANT) ADJUVANTED, 2 DOSE: ICD-10-PCS | Mod: S$GLB,,, | Performed by: STUDENT IN AN ORGANIZED HEALTH CARE EDUCATION/TRAINING PROGRAM

## 2022-06-13 PROCEDURE — 90739 HEPB VACC 2/4 DOSE ADULT IM: CPT | Mod: S$GLB,,, | Performed by: STUDENT IN AN ORGANIZED HEALTH CARE EDUCATION/TRAINING PROGRAM

## 2022-06-13 PROCEDURE — 36415 COLL VENOUS BLD VENIPUNCTURE: CPT | Mod: PO | Performed by: STUDENT IN AN ORGANIZED HEALTH CARE EDUCATION/TRAINING PROGRAM

## 2022-06-13 PROCEDURE — 83036 HEMOGLOBIN GLYCOSYLATED A1C: CPT | Performed by: STUDENT IN AN ORGANIZED HEALTH CARE EDUCATION/TRAINING PROGRAM

## 2022-08-02 ENCOUNTER — TELEPHONE (OUTPATIENT)
Dept: FAMILY MEDICINE | Facility: CLINIC | Age: 57
End: 2022-08-02
Payer: COMMERCIAL

## 2022-08-02 NOTE — TELEPHONE ENCOUNTER
----- Message from Osvaldo Melvin sent at 8/2/2022  2:31 PM CDT -----   Name of Who is Calling:     What is the request in detail:   patient request call back in reference to schedule  surgery clearance  Please contact to further discuss and advise      Can the clinic reply by [facility]:     What Number to Call Back if not in [facility]:  [phone]

## 2022-08-09 ENCOUNTER — OFFICE VISIT (OUTPATIENT)
Dept: FAMILY MEDICINE | Facility: CLINIC | Age: 57
End: 2022-08-09
Payer: COMMERCIAL

## 2022-08-09 ENCOUNTER — LAB VISIT (OUTPATIENT)
Dept: LAB | Facility: HOSPITAL | Age: 57
End: 2022-08-09
Attending: STUDENT IN AN ORGANIZED HEALTH CARE EDUCATION/TRAINING PROGRAM
Payer: COMMERCIAL

## 2022-08-09 VITALS
BODY MASS INDEX: 47.09 KG/M2 | TEMPERATURE: 98 F | HEART RATE: 71 BPM | HEIGHT: 66 IN | SYSTOLIC BLOOD PRESSURE: 136 MMHG | DIASTOLIC BLOOD PRESSURE: 72 MMHG | OXYGEN SATURATION: 98 % | WEIGHT: 293 LBS

## 2022-08-09 DIAGNOSIS — E66.01 CLASS 3 SEVERE OBESITY DUE TO EXCESS CALORIES WITH SERIOUS COMORBIDITY AND BODY MASS INDEX (BMI) OF 45.0 TO 49.9 IN ADULT: ICD-10-CM

## 2022-08-09 DIAGNOSIS — G47.01 INSOMNIA DUE TO MEDICAL CONDITION: ICD-10-CM

## 2022-08-09 DIAGNOSIS — Z01.818 PREOP EXAMINATION: Primary | ICD-10-CM

## 2022-08-09 DIAGNOSIS — E55.9 VITAMIN D DEFICIENCY: ICD-10-CM

## 2022-08-09 DIAGNOSIS — F32.A CHRONIC DEPRESSION: ICD-10-CM

## 2022-08-09 DIAGNOSIS — Z01.818 PREOP EXAMINATION: ICD-10-CM

## 2022-08-09 DIAGNOSIS — J31.0 CHRONIC RHINITIS: ICD-10-CM

## 2022-08-09 DIAGNOSIS — I11.9 HYPERTENSIVE HEART DISEASE, UNSPECIFIED WHETHER HEART FAILURE PRESENT: ICD-10-CM

## 2022-08-09 DIAGNOSIS — Z79.899 CHRONIC PRESCRIPTION BENZODIAZEPINE USE: ICD-10-CM

## 2022-08-09 DIAGNOSIS — M54.16 LUMBAR RADICULITIS: ICD-10-CM

## 2022-08-09 LAB
25(OH)D3+25(OH)D2 SERPL-MCNC: 26 NG/ML (ref 30–96)
ALBUMIN SERPL BCP-MCNC: 4 G/DL (ref 3.5–5.2)
ALP SERPL-CCNC: 107 U/L (ref 55–135)
ALT SERPL W/O P-5'-P-CCNC: 42 U/L (ref 10–44)
ANION GAP SERPL CALC-SCNC: 14 MMOL/L (ref 8–16)
AST SERPL-CCNC: 26 U/L (ref 10–40)
BASOPHILS # BLD AUTO: 0.05 K/UL (ref 0–0.2)
BASOPHILS NFR BLD: 0.7 % (ref 0–1.9)
BILIRUB SERPL-MCNC: 0.4 MG/DL (ref 0.1–1)
BUN SERPL-MCNC: 15 MG/DL (ref 6–20)
CALCIUM SERPL-MCNC: 10.2 MG/DL (ref 8.7–10.5)
CHLORIDE SERPL-SCNC: 106 MMOL/L (ref 95–110)
CHOLEST SERPL-MCNC: 203 MG/DL (ref 120–199)
CHOLEST/HDLC SERPL: 4.3 {RATIO} (ref 2–5)
CO2 SERPL-SCNC: 21 MMOL/L (ref 23–29)
CREAT SERPL-MCNC: 0.9 MG/DL (ref 0.5–1.4)
DIFFERENTIAL METHOD: ABNORMAL
EOSINOPHIL # BLD AUTO: 0.2 K/UL (ref 0–0.5)
EOSINOPHIL NFR BLD: 2.6 % (ref 0–8)
ERYTHROCYTE [DISTWIDTH] IN BLOOD BY AUTOMATED COUNT: 13 % (ref 11.5–14.5)
EST. GFR  (NO RACE VARIABLE): >60 ML/MIN/1.73 M^2
ESTIMATED AVG GLUCOSE: 97 MG/DL (ref 68–131)
GLUCOSE SERPL-MCNC: 78 MG/DL (ref 70–110)
HBA1C MFR BLD: 5 % (ref 4–5.6)
HCT VFR BLD AUTO: 42.1 % (ref 37–48.5)
HDLC SERPL-MCNC: 47 MG/DL (ref 40–75)
HDLC SERPL: 23.2 % (ref 20–50)
HGB BLD-MCNC: 14.2 G/DL (ref 12–16)
IMM GRANULOCYTES # BLD AUTO: 0.02 K/UL (ref 0–0.04)
IMM GRANULOCYTES NFR BLD AUTO: 0.3 % (ref 0–0.5)
LDLC SERPL CALC-MCNC: 135.6 MG/DL (ref 63–159)
LYMPHOCYTES # BLD AUTO: 2.4 K/UL (ref 1–4.8)
LYMPHOCYTES NFR BLD: 32.1 % (ref 18–48)
MAGNESIUM SERPL-MCNC: 2.1 MG/DL (ref 1.6–2.6)
MCH RBC QN AUTO: 31.4 PG (ref 27–31)
MCHC RBC AUTO-ENTMCNC: 33.7 G/DL (ref 32–36)
MCV RBC AUTO: 93 FL (ref 82–98)
MONOCYTES # BLD AUTO: 0.4 K/UL (ref 0.3–1)
MONOCYTES NFR BLD: 5.8 % (ref 4–15)
NEUTROPHILS # BLD AUTO: 4.5 K/UL (ref 1.8–7.7)
NEUTROPHILS NFR BLD: 58.5 % (ref 38–73)
NONHDLC SERPL-MCNC: 156 MG/DL
NRBC BLD-RTO: 0 /100 WBC
PLATELET # BLD AUTO: 239 K/UL (ref 150–450)
PMV BLD AUTO: 10.6 FL (ref 9.2–12.9)
POTASSIUM SERPL-SCNC: 4.4 MMOL/L (ref 3.5–5.1)
PROT SERPL-MCNC: 8 G/DL (ref 6–8.4)
RBC # BLD AUTO: 4.52 M/UL (ref 4–5.4)
SODIUM SERPL-SCNC: 141 MMOL/L (ref 136–145)
TRIGL SERPL-MCNC: 102 MG/DL (ref 30–150)
TSH SERPL DL<=0.005 MIU/L-ACNC: 1.31 UIU/ML (ref 0.4–4)
VIT B12 SERPL-MCNC: 444 PG/ML (ref 210–950)
WBC # BLD AUTO: 7.6 K/UL (ref 3.9–12.7)

## 2022-08-09 PROCEDURE — 85025 COMPLETE CBC W/AUTO DIFF WBC: CPT | Performed by: STUDENT IN AN ORGANIZED HEALTH CARE EDUCATION/TRAINING PROGRAM

## 2022-08-09 PROCEDURE — 80061 LIPID PANEL: CPT | Performed by: STUDENT IN AN ORGANIZED HEALTH CARE EDUCATION/TRAINING PROGRAM

## 2022-08-09 PROCEDURE — 4010F ACE/ARB THERAPY RXD/TAKEN: CPT | Mod: CPTII,S$GLB,, | Performed by: STUDENT IN AN ORGANIZED HEALTH CARE EDUCATION/TRAINING PROGRAM

## 2022-08-09 PROCEDURE — 3044F PR MOST RECENT HEMOGLOBIN A1C LEVEL <7.0%: ICD-10-PCS | Mod: CPTII,S$GLB,, | Performed by: STUDENT IN AN ORGANIZED HEALTH CARE EDUCATION/TRAINING PROGRAM

## 2022-08-09 PROCEDURE — 99214 PR OFFICE/OUTPT VISIT, EST, LEVL IV, 30-39 MIN: ICD-10-PCS | Mod: S$GLB,,, | Performed by: STUDENT IN AN ORGANIZED HEALTH CARE EDUCATION/TRAINING PROGRAM

## 2022-08-09 PROCEDURE — 82607 VITAMIN B-12: CPT | Performed by: STUDENT IN AN ORGANIZED HEALTH CARE EDUCATION/TRAINING PROGRAM

## 2022-08-09 PROCEDURE — 3075F PR MOST RECENT SYSTOLIC BLOOD PRESS GE 130-139MM HG: ICD-10-PCS | Mod: CPTII,S$GLB,, | Performed by: STUDENT IN AN ORGANIZED HEALTH CARE EDUCATION/TRAINING PROGRAM

## 2022-08-09 PROCEDURE — 1159F PR MEDICATION LIST DOCUMENTED IN MEDICAL RECORD: ICD-10-PCS | Mod: CPTII,S$GLB,, | Performed by: STUDENT IN AN ORGANIZED HEALTH CARE EDUCATION/TRAINING PROGRAM

## 2022-08-09 PROCEDURE — 3008F BODY MASS INDEX DOCD: CPT | Mod: CPTII,S$GLB,, | Performed by: STUDENT IN AN ORGANIZED HEALTH CARE EDUCATION/TRAINING PROGRAM

## 2022-08-09 PROCEDURE — 83735 ASSAY OF MAGNESIUM: CPT | Performed by: STUDENT IN AN ORGANIZED HEALTH CARE EDUCATION/TRAINING PROGRAM

## 2022-08-09 PROCEDURE — 83036 HEMOGLOBIN GLYCOSYLATED A1C: CPT | Performed by: STUDENT IN AN ORGANIZED HEALTH CARE EDUCATION/TRAINING PROGRAM

## 2022-08-09 PROCEDURE — 3075F SYST BP GE 130 - 139MM HG: CPT | Mod: CPTII,S$GLB,, | Performed by: STUDENT IN AN ORGANIZED HEALTH CARE EDUCATION/TRAINING PROGRAM

## 2022-08-09 PROCEDURE — 1159F MED LIST DOCD IN RCRD: CPT | Mod: CPTII,S$GLB,, | Performed by: STUDENT IN AN ORGANIZED HEALTH CARE EDUCATION/TRAINING PROGRAM

## 2022-08-09 PROCEDURE — 4010F PR ACE/ARB THEARPY RXD/TAKEN: ICD-10-PCS | Mod: CPTII,S$GLB,, | Performed by: STUDENT IN AN ORGANIZED HEALTH CARE EDUCATION/TRAINING PROGRAM

## 2022-08-09 PROCEDURE — 3078F DIAST BP <80 MM HG: CPT | Mod: CPTII,S$GLB,, | Performed by: STUDENT IN AN ORGANIZED HEALTH CARE EDUCATION/TRAINING PROGRAM

## 2022-08-09 PROCEDURE — 36415 COLL VENOUS BLD VENIPUNCTURE: CPT | Mod: PO | Performed by: STUDENT IN AN ORGANIZED HEALTH CARE EDUCATION/TRAINING PROGRAM

## 2022-08-09 PROCEDURE — 84443 ASSAY THYROID STIM HORMONE: CPT | Performed by: STUDENT IN AN ORGANIZED HEALTH CARE EDUCATION/TRAINING PROGRAM

## 2022-08-09 PROCEDURE — 84425 ASSAY OF VITAMIN B-1: CPT | Performed by: STUDENT IN AN ORGANIZED HEALTH CARE EDUCATION/TRAINING PROGRAM

## 2022-08-09 PROCEDURE — 3008F PR BODY MASS INDEX (BMI) DOCUMENTED: ICD-10-PCS | Mod: CPTII,S$GLB,, | Performed by: STUDENT IN AN ORGANIZED HEALTH CARE EDUCATION/TRAINING PROGRAM

## 2022-08-09 PROCEDURE — 99999 PR PBB SHADOW E&M-EST. PATIENT-LVL III: ICD-10-PCS | Mod: PBBFAC,,, | Performed by: STUDENT IN AN ORGANIZED HEALTH CARE EDUCATION/TRAINING PROGRAM

## 2022-08-09 PROCEDURE — 82306 VITAMIN D 25 HYDROXY: CPT | Performed by: STUDENT IN AN ORGANIZED HEALTH CARE EDUCATION/TRAINING PROGRAM

## 2022-08-09 PROCEDURE — 80053 COMPREHEN METABOLIC PANEL: CPT | Performed by: STUDENT IN AN ORGANIZED HEALTH CARE EDUCATION/TRAINING PROGRAM

## 2022-08-09 PROCEDURE — 99999 PR PBB SHADOW E&M-EST. PATIENT-LVL III: CPT | Mod: PBBFAC,,, | Performed by: STUDENT IN AN ORGANIZED HEALTH CARE EDUCATION/TRAINING PROGRAM

## 2022-08-09 PROCEDURE — 3078F PR MOST RECENT DIASTOLIC BLOOD PRESSURE < 80 MM HG: ICD-10-PCS | Mod: CPTII,S$GLB,, | Performed by: STUDENT IN AN ORGANIZED HEALTH CARE EDUCATION/TRAINING PROGRAM

## 2022-08-09 PROCEDURE — 3044F HG A1C LEVEL LT 7.0%: CPT | Mod: CPTII,S$GLB,, | Performed by: STUDENT IN AN ORGANIZED HEALTH CARE EDUCATION/TRAINING PROGRAM

## 2022-08-09 PROCEDURE — 99214 OFFICE O/P EST MOD 30 MIN: CPT | Mod: S$GLB,,, | Performed by: STUDENT IN AN ORGANIZED HEALTH CARE EDUCATION/TRAINING PROGRAM

## 2022-08-09 RX ORDER — CELECOXIB 200 MG/1
200 CAPSULE ORAL DAILY
Qty: 30 CAPSULE | Refills: 0 | Status: SHIPPED | OUTPATIENT
Start: 2022-08-09 | End: 2022-10-26

## 2022-08-09 RX ORDER — AMOXICILLIN AND CLAVULANATE POTASSIUM 875; 125 MG/1; MG/1
1 TABLET, FILM COATED ORAL 2 TIMES DAILY
COMMUNITY
Start: 2022-06-15 | End: 2022-08-09

## 2022-08-09 RX ORDER — IPRATROPIUM BROMIDE 42 UG/1
2 SPRAY, METERED NASAL 2 TIMES DAILY
Qty: 15 ML | Refills: 11 | Status: SHIPPED | OUTPATIENT
Start: 2022-08-09 | End: 2024-01-04 | Stop reason: SDUPTHER

## 2022-08-09 RX ORDER — ESCITALOPRAM OXALATE 10 MG/1
10 TABLET ORAL DAILY
Qty: 90 TABLET | Refills: 3 | Status: SHIPPED | OUTPATIENT
Start: 2022-08-09 | End: 2023-06-30 | Stop reason: SDUPTHER

## 2022-08-09 RX ORDER — LORAZEPAM 1 MG/1
1 TABLET ORAL NIGHTLY
Qty: 30 TABLET | Refills: 1 | Status: SHIPPED | OUTPATIENT
Start: 2022-08-09 | End: 2022-09-28

## 2022-08-09 RX ORDER — AMLODIPINE BESYLATE 5 MG/1
5 TABLET ORAL 2 TIMES DAILY
Qty: 60 TABLET | Refills: 3
Start: 2022-08-09 | End: 2022-11-03 | Stop reason: SDUPTHER

## 2022-08-09 RX ORDER — LORAZEPAM 1 MG/1
1 TABLET ORAL EVERY 8 HOURS PRN
Qty: 30 TABLET | Refills: 1 | Status: SHIPPED | OUTPATIENT
Start: 2022-08-09 | End: 2022-08-09

## 2022-08-09 RX ORDER — ERGOCALCIFEROL 1.25 MG/1
50000 CAPSULE ORAL
Qty: 12 CAPSULE | Refills: 0 | Status: SHIPPED | OUTPATIENT
Start: 2022-08-09 | End: 2022-10-26

## 2022-08-09 NOTE — PROGRESS NOTES
"Chief Complaint   Patient presents with    Pre-op Exam     CC: pre-op     Subjective:      Margie Herrreadejan who presents to the office today for a preoperative consultation at the request of surgeon Dr. Torres who plans on performing gastric sleeve on TBD . Planned anesthesia: general.   The patient has the following known anesthesia issues: nausea.     Patients bleeding risk: no recent abnormal bleeding and no remote history of abnormal bleeding.     Patient does not have objections to receiving blood products if needed.    FUNCTIONAL STATUS >4 METS     The following portions of the patient's history were reviewed and updated as appropriate: allergies, current medications, past family history, past medical history, past social history, past surgical history and problem list.    Review of Systems  Pertinent items are noted in HPI.     7/26/22 Lexiscan  EKG Interpretation:   Normal sinus rhythm with multiple resting ST segment abnormalities and   possible anterior infarct.   No ST segment Changes with Infusion     Findings:   1. Negative Lexiscan stress test   2. Nuclear results to be reported separately     IMPRESSION:     1.   No scintigraphic evidence of myocardial infarction or ischemia.     EGD ON 7/8  Gastric antral mucosa w. Mild features of reactive gastropathy  No acute inflammatory activity prsent  No h. Pylori  Negative for intestinal metaplasia, epithelial dysplasia or malignancy       Objective:     Vitals:    08/09/22 0838   BP: 136/72   Pulse: 71   Temp: 98.3 °F (36.8 °C)          /72 (BP Location: Right arm, Patient Position: Sitting, BP Method: Large (Manual))   Pulse 71   Temp 98.3 °F (36.8 °C) (Oral)   Ht 5' 6" (1.676 m)   Wt 135.4 kg (298 lb 8.1 oz)   SpO2 98%   BMI 48.18 kg/m²     General Appearance:    Alert, cooperative, no distress, appears stated age   Head:    Normocephalic, without obvious abnormality, atraumatic   Eyes:    PERRL, conjunctiva/corneas clear, EOM's intact, fundi     " benign, both eyes   Ears:    Normal TM's and external ear canals, both ears   Nose:   Nares normal, septum midline, mucosa normal, no drainage    or sinus tenderness   Throat:   Lips, mucosa, and tongue normal; teeth and gums normal   Neck:   Supple, symmetrical, trachea midline, no adenopathy;     thyroid:  no enlargement/tenderness/nodules; no carotid    bruit or JVD   Back:     Symmetric, no curvature, ROM normal, no CVA tenderness   Lungs:     Clear to auscultation bilaterally, respirations unlabored   Chest Wall:    No tenderness or deformity    Heart:    Regular rate and rhythm, S1 and S2 normal, no murmur, rub   or gallop   Breast Exam:    No tenderness, masses, or nipple abnormality   Abdomen:     Soft, non-tender, bowel sounds active all four quadrants,     no masses, no organomegaly           Extremities:   Extremities normal, atraumatic, no cyanosis or edema   Pulses:   2+ and symmetric all extremities   Skin:   Skin color, texture, turgor normal, no rashes or lesions   Lymph nodes:   Cervical, supraclavicular, and axillary nodes normal   Neurologic:   CNII-XII intact, normal strength, sensation and reflexes     throughout          Assessment:       Margie Cifuentes  with planned surgery as above.    Known risk factors for perioperative complications: None  HTN      1. Preop examination  - Comprehensive Metabolic Panel; Future  - CBC Auto Differential; Future  - TSH; Future  - Hemoglobin A1C; Future  - Vitamin B12; Future  - Magnesium; Future  - Vitamin D; Future  - VITAMIN B1; Future  - Lipid Panel; Future    Based on recent labs, stress testing and cardiology eval, pt is medically optimized for moderate risk procedure.     2. Insomnia due to medical condition  - LORazepam (ATIVAN) 1 MG tablet; Take 1 tablet (1 mg total) by mouth every evening.  Dispense: 30 tablet; Refill: 1  - reviewed    3. Lumbar radiculitis  - celecoxib (CELEBREX) 200 MG capsule; Take 1 capsule (200 mg total) by mouth once daily.   Dispense: 30 capsule; Refill: 0   Discussed that she may have to d/c after surgery and to speak with surgery team .   4. Vitamin D deficiency  - ergocalciferol (ERGOCALCIFEROL) 50,000 unit Cap; Take 1 capsule (50,000 Units total) by mouth every 7 days.  Dispense: 12 capsule; Refill: 0    5. Chronic depression  - REFILLED EScitalopram oxalate (LEXAPRO) 10 MG tablet; Take 1 tablet (10 mg total) by mouth once daily.  Dispense: 90 tablet; Refill: 3    6. Hypertensive heart disease, unspecified whether heart failure present  - REFILLED amLODIPine (NORVASC) 5 MG tablet; Take 1 tablet (5 mg total) by mouth 2 (two) times daily.  Dispense: 60 tablet; Refill: 3  -well controlled   7. Chronic rhinitis  - ipratropium (ATROVENT) 42 mcg (0.06 %) nasal spray; 2 sprays by Nasal route 2 (two) times daily.  Dispense: 15 mL; Refill: 11    8. Class 3 severe obesity due to excess calories with serious comorbidity and body mass index (BMI) of 45.0 to 49.9 in adult  Will be having gastric sleeve management per bariatrics     9. Chronic prescription benzodiazepine use    RTC as needed.       Klaudia Fernandez MD  Family Medicine

## 2022-08-11 ENCOUNTER — TELEPHONE (OUTPATIENT)
Dept: FAMILY MEDICINE | Facility: CLINIC | Age: 57
End: 2022-08-11
Payer: COMMERCIAL

## 2022-08-11 NOTE — PROGRESS NOTES
Please let pt know that her labs show borderline low vitamin d and borderline high cholesterol. Otherwise they are ok.

## 2022-08-11 NOTE — TELEPHONE ENCOUNTER
----- Message from Kladuia Fernandez MD sent at 8/11/2022  1:53 PM CDT -----  Please let pt know that her labs show borderline low vitamin d and borderline high cholesterol. Otherwise they are ok.

## 2022-08-15 LAB — VIT B1 BLD-MCNC: 65 UG/L (ref 38–122)

## 2022-08-18 ENCOUNTER — PATIENT MESSAGE (OUTPATIENT)
Dept: FAMILY MEDICINE | Facility: CLINIC | Age: 57
End: 2022-08-18
Payer: COMMERCIAL

## 2022-08-19 ENCOUNTER — PATIENT MESSAGE (OUTPATIENT)
Dept: FAMILY MEDICINE | Facility: CLINIC | Age: 57
End: 2022-08-19
Payer: COMMERCIAL

## 2022-09-02 ENCOUNTER — PATIENT MESSAGE (OUTPATIENT)
Dept: BARIATRICS | Facility: CLINIC | Age: 57
End: 2022-09-02
Payer: COMMERCIAL

## 2022-10-11 ENCOUNTER — PATIENT MESSAGE (OUTPATIENT)
Dept: ADMINISTRATIVE | Facility: HOSPITAL | Age: 57
End: 2022-10-11
Payer: COMMERCIAL

## 2022-10-20 ENCOUNTER — PATIENT MESSAGE (OUTPATIENT)
Dept: HEMATOLOGY/ONCOLOGY | Facility: CLINIC | Age: 57
End: 2022-10-20
Payer: COMMERCIAL

## 2022-10-20 DIAGNOSIS — Z17.0 MALIGNANT NEOPLASM OF LEFT BREAST IN FEMALE, ESTROGEN RECEPTOR POSITIVE, UNSPECIFIED SITE OF BREAST: Primary | ICD-10-CM

## 2022-10-20 DIAGNOSIS — R74.8 ELEVATED LIVER ENZYMES: ICD-10-CM

## 2022-10-20 DIAGNOSIS — C50.912 MALIGNANT NEOPLASM OF LEFT BREAST IN FEMALE, ESTROGEN RECEPTOR POSITIVE, UNSPECIFIED SITE OF BREAST: Primary | ICD-10-CM

## 2022-10-25 ENCOUNTER — OFFICE VISIT (OUTPATIENT)
Dept: FAMILY MEDICINE | Facility: CLINIC | Age: 57
End: 2022-10-25
Payer: COMMERCIAL

## 2022-10-25 VITALS
SYSTOLIC BLOOD PRESSURE: 118 MMHG | DIASTOLIC BLOOD PRESSURE: 70 MMHG | WEIGHT: 274.69 LBS | HEART RATE: 64 BPM | HEIGHT: 66 IN | TEMPERATURE: 98 F | OXYGEN SATURATION: 95 % | BODY MASS INDEX: 44.15 KG/M2

## 2022-10-25 DIAGNOSIS — Z90.3 S/P GASTRIC SLEEVE PROCEDURE: ICD-10-CM

## 2022-10-25 DIAGNOSIS — I10 BENIGN HYPERTENSION: ICD-10-CM

## 2022-10-25 DIAGNOSIS — Z23 INFLUENZA VACCINE NEEDED: ICD-10-CM

## 2022-10-25 DIAGNOSIS — E66.01 CLASS 3 SEVERE OBESITY DUE TO EXCESS CALORIES WITH SERIOUS COMORBIDITY AND BODY MASS INDEX (BMI) OF 45.0 TO 49.9 IN ADULT: ICD-10-CM

## 2022-10-25 DIAGNOSIS — Z23 NEED FOR PNEUMOCOCCAL VACCINATION: Primary | ICD-10-CM

## 2022-10-25 PROCEDURE — 99999 PR PBB SHADOW E&M-EST. PATIENT-LVL IV: ICD-10-PCS | Mod: PBBFAC,,, | Performed by: STUDENT IN AN ORGANIZED HEALTH CARE EDUCATION/TRAINING PROGRAM

## 2022-10-25 PROCEDURE — 90471 FLU VACCINE (QUAD) GREATER THAN OR EQUAL TO 3YO PRESERVATIVE FREE IM: ICD-10-PCS | Mod: S$GLB,,, | Performed by: STUDENT IN AN ORGANIZED HEALTH CARE EDUCATION/TRAINING PROGRAM

## 2022-10-25 PROCEDURE — 1159F MED LIST DOCD IN RCRD: CPT | Mod: CPTII,S$GLB,, | Performed by: STUDENT IN AN ORGANIZED HEALTH CARE EDUCATION/TRAINING PROGRAM

## 2022-10-25 PROCEDURE — 99214 OFFICE O/P EST MOD 30 MIN: CPT | Mod: 25,S$GLB,, | Performed by: STUDENT IN AN ORGANIZED HEALTH CARE EDUCATION/TRAINING PROGRAM

## 2022-10-25 PROCEDURE — 1159F PR MEDICATION LIST DOCUMENTED IN MEDICAL RECORD: ICD-10-PCS | Mod: CPTII,S$GLB,, | Performed by: STUDENT IN AN ORGANIZED HEALTH CARE EDUCATION/TRAINING PROGRAM

## 2022-10-25 PROCEDURE — 3044F PR MOST RECENT HEMOGLOBIN A1C LEVEL <7.0%: ICD-10-PCS | Mod: CPTII,S$GLB,, | Performed by: STUDENT IN AN ORGANIZED HEALTH CARE EDUCATION/TRAINING PROGRAM

## 2022-10-25 PROCEDURE — 90686 FLU VACCINE (QUAD) GREATER THAN OR EQUAL TO 3YO PRESERVATIVE FREE IM: ICD-10-PCS | Mod: S$GLB,,, | Performed by: STUDENT IN AN ORGANIZED HEALTH CARE EDUCATION/TRAINING PROGRAM

## 2022-10-25 PROCEDURE — 4010F PR ACE/ARB THEARPY RXD/TAKEN: ICD-10-PCS | Mod: CPTII,S$GLB,, | Performed by: STUDENT IN AN ORGANIZED HEALTH CARE EDUCATION/TRAINING PROGRAM

## 2022-10-25 PROCEDURE — 99214 PR OFFICE/OUTPT VISIT, EST, LEVL IV, 30-39 MIN: ICD-10-PCS | Mod: 25,S$GLB,, | Performed by: STUDENT IN AN ORGANIZED HEALTH CARE EDUCATION/TRAINING PROGRAM

## 2022-10-25 PROCEDURE — 3044F HG A1C LEVEL LT 7.0%: CPT | Mod: CPTII,S$GLB,, | Performed by: STUDENT IN AN ORGANIZED HEALTH CARE EDUCATION/TRAINING PROGRAM

## 2022-10-25 PROCEDURE — 3078F PR MOST RECENT DIASTOLIC BLOOD PRESSURE < 80 MM HG: ICD-10-PCS | Mod: CPTII,S$GLB,, | Performed by: STUDENT IN AN ORGANIZED HEALTH CARE EDUCATION/TRAINING PROGRAM

## 2022-10-25 PROCEDURE — 3074F PR MOST RECENT SYSTOLIC BLOOD PRESSURE < 130 MM HG: ICD-10-PCS | Mod: CPTII,S$GLB,, | Performed by: STUDENT IN AN ORGANIZED HEALTH CARE EDUCATION/TRAINING PROGRAM

## 2022-10-25 PROCEDURE — 4010F ACE/ARB THERAPY RXD/TAKEN: CPT | Mod: CPTII,S$GLB,, | Performed by: STUDENT IN AN ORGANIZED HEALTH CARE EDUCATION/TRAINING PROGRAM

## 2022-10-25 PROCEDURE — 90677 PCV20 VACCINE IM: CPT | Mod: S$GLB,,, | Performed by: STUDENT IN AN ORGANIZED HEALTH CARE EDUCATION/TRAINING PROGRAM

## 2022-10-25 PROCEDURE — 99999 PR PBB SHADOW E&M-EST. PATIENT-LVL IV: CPT | Mod: PBBFAC,,, | Performed by: STUDENT IN AN ORGANIZED HEALTH CARE EDUCATION/TRAINING PROGRAM

## 2022-10-25 PROCEDURE — 90472 PNEUMOCOCCAL CONJUGATE VACCINE 20-VALENT: ICD-10-PCS | Mod: S$GLB,,, | Performed by: STUDENT IN AN ORGANIZED HEALTH CARE EDUCATION/TRAINING PROGRAM

## 2022-10-25 PROCEDURE — 90686 IIV4 VACC NO PRSV 0.5 ML IM: CPT | Mod: S$GLB,,, | Performed by: STUDENT IN AN ORGANIZED HEALTH CARE EDUCATION/TRAINING PROGRAM

## 2022-10-25 PROCEDURE — 90472 IMMUNIZATION ADMIN EACH ADD: CPT | Mod: S$GLB,,, | Performed by: STUDENT IN AN ORGANIZED HEALTH CARE EDUCATION/TRAINING PROGRAM

## 2022-10-25 PROCEDURE — 3078F DIAST BP <80 MM HG: CPT | Mod: CPTII,S$GLB,, | Performed by: STUDENT IN AN ORGANIZED HEALTH CARE EDUCATION/TRAINING PROGRAM

## 2022-10-25 PROCEDURE — 90677 PNEUMOCOCCAL CONJUGATE VACCINE 20-VALENT: ICD-10-PCS | Mod: S$GLB,,, | Performed by: STUDENT IN AN ORGANIZED HEALTH CARE EDUCATION/TRAINING PROGRAM

## 2022-10-25 PROCEDURE — 3074F SYST BP LT 130 MM HG: CPT | Mod: CPTII,S$GLB,, | Performed by: STUDENT IN AN ORGANIZED HEALTH CARE EDUCATION/TRAINING PROGRAM

## 2022-10-25 PROCEDURE — 90471 IMMUNIZATION ADMIN: CPT | Mod: S$GLB,,, | Performed by: STUDENT IN AN ORGANIZED HEALTH CARE EDUCATION/TRAINING PROGRAM

## 2022-10-25 RX ORDER — BETAMETHASONE DIPROPIONATE 0.5 MG/G
CREAM TOPICAL
COMMUNITY
Start: 2022-10-11 | End: 2024-01-04

## 2022-10-25 NOTE — PROGRESS NOTES
10/25/2022    Margie Cifuentes  [id number]    Chief Complaint   Patient presents with    gastric sleeve follow up    Hypertension     Follow up       HPI    Patient presents for gastric sleeve follow up. S/p gastric sleeve surgery  3 weeks ago. Since then has lost 20+ pounds. Will be able to start eating solids this week, so very excited. Also concerned about lowering blood pressure. Her new average is 130/80s. The only medication that was discontinued was lasix. Still taking amlodipine and valsartan.     Negative 10 point ROS outside of HPI    Social History     Socioeconomic History    Marital status:     Number of children: 2    Years of education: [employer]   Occupational History    Occupation: Works for a [employer] company     Employer: [employer]   Tobacco Use    Smoking status: Never    Smokeless tobacco: Never   Substance and Sexual Activity    Alcohol use: No    Drug use: No    Sexual activity: Yes     Partners: Male   Social History Narrative    Adult Screenings updated and reviewed  4/10/14    Mammogram( for females) history of bilateral mastectomy    Pap ( for females)- years      Colonoscopy - done 2006 normal    Flu shot - 2013    Td 2005    Pneumovax updated 4/10/14 due again  2019    Zostavax recommended one time at  age  59    Eye exam  Overdue     Bone density overdue            Current Outpatient Medications:     allopurinol (ZYLOPRIM) 100 MG tablet, Take 100 mg by mouth once daily., Disp: , Rfl:     amLODIPine (NORVASC) 5 MG tablet, Take 1 tablet (5 mg total) by mouth 2 (two) times daily., Disp: 60 tablet, Rfl: 3    betamethasone dipropionate 0.05 % cream, SMARTSIG:Sparingly Topical Twice Daily PRN, Disp: , Rfl:     celecoxib (CELEBREX) 200 MG capsule, Take 1 capsule (200 mg total) by mouth once daily., Disp: 30 capsule, Rfl: 0    cetirizine (ZYRTEC) 10 MG tablet, Take 10 mg by mouth once daily., Disp: , Rfl:     ergocalciferol (ERGOCALCIFEROL) 50,000 unit Cap, Take 1 capsule (50,000 Units  total) by mouth every 7 days., Disp: 12 capsule, Rfl: 0    EScitalopram oxalate (LEXAPRO) 10 MG tablet, Take 1 tablet (10 mg total) by mouth once daily., Disp: 90 tablet, Rfl: 3    ipratropium (ATROVENT) 42 mcg (0.06 %) nasal spray, 2 sprays by Nasal route 2 (two) times daily., Disp: 15 mL, Rfl: 11    ketoconazole (NIZORAL) 2 % shampoo, Apply 1 application topically twice a week., Disp: , Rfl:     latanoprost 0.005 % ophthalmic solution, INSTILL ONE DROP IN EACH EYE EVERY NIGHT AT BEDTIME., Disp: , Rfl:     [START ON 10/26/2022] LORazepam (ATIVAN) 1 MG tablet, TAKE ONE TABLET BY MOUTH EVERY EVENING., Disp: 30 tablet, Rfl: 0    minoxidiL (LONITEN) 2.5 MG tablet, Take 1 tablet (2.5 mg total) by mouth once daily., Disp: 90 tablet, Rfl: 3    valsartan (DIOVAN) 320 MG tablet, Take 320 mg by mouth once daily., Disp: , Rfl:       Physical Exam  Vitals:    10/25/22 0829   BP: 118/70   Pulse: 64   Temp: 98.2 °F (36.8 °C)     Gen: well appearing, NAD  Resp: non labored breathing, no crackles, no wheezes, CTAB  CV: RRR no murmur, gallops, rubs, no LE edema  Abd: soft nontender BS present no organomegaly  Incision: ea incision with surrounding erythema, no drainage, non tender    1. Need for pneumococcal vaccination  - (In Office Administered) Pneumococcal Conjugate Vaccine (20 Valent) (IM)    2. Influenza vaccine needed  - Influenza - Quadrivalent *Preferred* (6 months+) (PF)    3. Benign hypertension  Hold valsartan   Continue amlodipine 5 mg BID    4. Class 3 severe obesity due to excess calories with serious comorbidity and body mass index (BMI) of 45.0 to 49.9 in adult  Management per bariatric.    5. S/P gastric sleeve procedure  Management per bariatric.      RTC as needed for routine care    Klaudia Fernandez MD  Family Medicine

## 2022-10-25 NOTE — PROGRESS NOTES
Patient given [id number] & flu vaccines via injection. 0 complaints of, tolerated well. VIS given & advised to wait in lobby 15 mins for monitoring. Verbalized understanding.

## 2022-12-01 ENCOUNTER — LAB VISIT (OUTPATIENT)
Dept: LAB | Facility: HOSPITAL | Age: 57
End: 2022-12-01
Attending: NURSE PRACTITIONER
Payer: COMMERCIAL

## 2022-12-01 DIAGNOSIS — K76.0 NAFLD (NONALCOHOLIC FATTY LIVER DISEASE): ICD-10-CM

## 2022-12-01 DIAGNOSIS — C50.912 MALIGNANT NEOPLASM OF LEFT BREAST IN FEMALE, ESTROGEN RECEPTOR POSITIVE, UNSPECIFIED SITE OF BREAST: ICD-10-CM

## 2022-12-01 DIAGNOSIS — R74.8 ELEVATED LIVER ENZYMES: ICD-10-CM

## 2022-12-01 DIAGNOSIS — Z17.0 MALIGNANT NEOPLASM OF LEFT BREAST IN FEMALE, ESTROGEN RECEPTOR POSITIVE, UNSPECIFIED SITE OF BREAST: ICD-10-CM

## 2022-12-01 LAB
ALBUMIN SERPL BCP-MCNC: 3.7 G/DL (ref 3.5–5.2)
ALBUMIN SERPL BCP-MCNC: 3.7 G/DL (ref 3.5–5.2)
ALP SERPL-CCNC: 109 U/L (ref 55–135)
ALP SERPL-CCNC: 109 U/L (ref 55–135)
ALT SERPL W/O P-5'-P-CCNC: 53 U/L (ref 10–44)
ALT SERPL W/O P-5'-P-CCNC: 53 U/L (ref 10–44)
ANION GAP SERPL CALC-SCNC: 10 MMOL/L (ref 8–16)
AST SERPL-CCNC: 32 U/L (ref 10–40)
AST SERPL-CCNC: 32 U/L (ref 10–40)
BASOPHILS # BLD AUTO: 0.04 K/UL (ref 0–0.2)
BASOPHILS NFR BLD: 0.6 % (ref 0–1.9)
BILIRUB DIRECT SERPL-MCNC: 0.3 MG/DL (ref 0.1–0.3)
BILIRUB SERPL-MCNC: 0.5 MG/DL (ref 0.1–1)
BILIRUB SERPL-MCNC: 0.5 MG/DL (ref 0.1–1)
BUN SERPL-MCNC: 13 MG/DL (ref 6–20)
CALCIUM SERPL-MCNC: 9.7 MG/DL (ref 8.7–10.5)
CHLORIDE SERPL-SCNC: 105 MMOL/L (ref 95–110)
CO2 SERPL-SCNC: 26 MMOL/L (ref 23–29)
CREAT SERPL-MCNC: 0.8 MG/DL (ref 0.5–1.4)
DIFFERENTIAL METHOD: ABNORMAL
EOSINOPHIL # BLD AUTO: 0.2 K/UL (ref 0–0.5)
EOSINOPHIL NFR BLD: 3.3 % (ref 0–8)
ERYTHROCYTE [DISTWIDTH] IN BLOOD BY AUTOMATED COUNT: 13.2 % (ref 11.5–14.5)
EST. GFR  (NO RACE VARIABLE): >60 ML/MIN/1.73 M^2
GLUCOSE SERPL-MCNC: 90 MG/DL (ref 70–110)
HCT VFR BLD AUTO: 40.1 % (ref 37–48.5)
HGB BLD-MCNC: 13.8 G/DL (ref 12–16)
IMM GRANULOCYTES # BLD AUTO: 0.01 K/UL (ref 0–0.04)
IMM GRANULOCYTES NFR BLD AUTO: 0.1 % (ref 0–0.5)
LYMPHOCYTES # BLD AUTO: 2.2 K/UL (ref 1–4.8)
LYMPHOCYTES NFR BLD: 31.8 % (ref 18–48)
MCH RBC QN AUTO: 31.1 PG (ref 27–31)
MCHC RBC AUTO-ENTMCNC: 34.4 G/DL (ref 32–36)
MCV RBC AUTO: 90 FL (ref 82–98)
MONOCYTES # BLD AUTO: 0.5 K/UL (ref 0.3–1)
MONOCYTES NFR BLD: 7.7 % (ref 4–15)
NEUTROPHILS # BLD AUTO: 3.9 K/UL (ref 1.8–7.7)
NEUTROPHILS NFR BLD: 56.5 % (ref 38–73)
NRBC BLD-RTO: 0 /100 WBC
PLATELET # BLD AUTO: 196 K/UL (ref 150–450)
PMV BLD AUTO: 10.3 FL (ref 9.2–12.9)
POTASSIUM SERPL-SCNC: 4.3 MMOL/L (ref 3.5–5.1)
PROT SERPL-MCNC: 7.5 G/DL (ref 6–8.4)
PROT SERPL-MCNC: 7.5 G/DL (ref 6–8.4)
RBC # BLD AUTO: 4.44 M/UL (ref 4–5.4)
SODIUM SERPL-SCNC: 141 MMOL/L (ref 136–145)
WBC # BLD AUTO: 6.98 K/UL (ref 3.9–12.7)

## 2022-12-01 PROCEDURE — 80053 COMPREHEN METABOLIC PANEL: CPT | Performed by: INTERNAL MEDICINE

## 2022-12-01 PROCEDURE — 80076 HEPATIC FUNCTION PANEL: CPT | Performed by: NURSE PRACTITIONER

## 2022-12-01 PROCEDURE — 85025 COMPLETE CBC W/AUTO DIFF WBC: CPT | Performed by: INTERNAL MEDICINE

## 2022-12-01 PROCEDURE — 36415 COLL VENOUS BLD VENIPUNCTURE: CPT | Performed by: NURSE PRACTITIONER

## 2022-12-07 ENCOUNTER — OFFICE VISIT (OUTPATIENT)
Dept: HEMATOLOGY/ONCOLOGY | Facility: CLINIC | Age: 57
End: 2022-12-07
Payer: COMMERCIAL

## 2022-12-07 VITALS
OXYGEN SATURATION: 95 % | HEART RATE: 59 BPM | SYSTOLIC BLOOD PRESSURE: 127 MMHG | BODY MASS INDEX: 42.57 KG/M2 | WEIGHT: 255.5 LBS | DIASTOLIC BLOOD PRESSURE: 75 MMHG | HEIGHT: 65 IN

## 2022-12-07 DIAGNOSIS — K76.0 FATTY LIVER: ICD-10-CM

## 2022-12-07 DIAGNOSIS — Z85.3 PERSONAL HISTORY OF MALIGNANT NEOPLASM OF BREAST: Primary | ICD-10-CM

## 2022-12-07 PROCEDURE — 99214 PR OFFICE/OUTPT VISIT, EST, LEVL IV, 30-39 MIN: ICD-10-PCS | Mod: S$GLB,,, | Performed by: INTERNAL MEDICINE

## 2022-12-07 PROCEDURE — 3078F DIAST BP <80 MM HG: CPT | Mod: CPTII,S$GLB,, | Performed by: INTERNAL MEDICINE

## 2022-12-07 PROCEDURE — 4010F ACE/ARB THERAPY RXD/TAKEN: CPT | Mod: CPTII,S$GLB,, | Performed by: INTERNAL MEDICINE

## 2022-12-07 PROCEDURE — 3074F PR MOST RECENT SYSTOLIC BLOOD PRESSURE < 130 MM HG: ICD-10-PCS | Mod: CPTII,S$GLB,, | Performed by: INTERNAL MEDICINE

## 2022-12-07 PROCEDURE — 3008F PR BODY MASS INDEX (BMI) DOCUMENTED: ICD-10-PCS | Mod: CPTII,S$GLB,, | Performed by: INTERNAL MEDICINE

## 2022-12-07 PROCEDURE — 3078F PR MOST RECENT DIASTOLIC BLOOD PRESSURE < 80 MM HG: ICD-10-PCS | Mod: CPTII,S$GLB,, | Performed by: INTERNAL MEDICINE

## 2022-12-07 PROCEDURE — 3044F PR MOST RECENT HEMOGLOBIN A1C LEVEL <7.0%: ICD-10-PCS | Mod: CPTII,S$GLB,, | Performed by: INTERNAL MEDICINE

## 2022-12-07 PROCEDURE — 4010F PR ACE/ARB THEARPY RXD/TAKEN: ICD-10-PCS | Mod: CPTII,S$GLB,, | Performed by: INTERNAL MEDICINE

## 2022-12-07 PROCEDURE — 99999 PR PBB SHADOW E&M-EST. PATIENT-LVL III: ICD-10-PCS | Mod: PBBFAC,,, | Performed by: INTERNAL MEDICINE

## 2022-12-07 PROCEDURE — 99999 PR PBB SHADOW E&M-EST. PATIENT-LVL III: CPT | Mod: PBBFAC,,, | Performed by: INTERNAL MEDICINE

## 2022-12-07 PROCEDURE — 3074F SYST BP LT 130 MM HG: CPT | Mod: CPTII,S$GLB,, | Performed by: INTERNAL MEDICINE

## 2022-12-07 PROCEDURE — 3044F HG A1C LEVEL LT 7.0%: CPT | Mod: CPTII,S$GLB,, | Performed by: INTERNAL MEDICINE

## 2022-12-07 PROCEDURE — 3008F BODY MASS INDEX DOCD: CPT | Mod: CPTII,S$GLB,, | Performed by: INTERNAL MEDICINE

## 2022-12-07 PROCEDURE — 99214 OFFICE O/P EST MOD 30 MIN: CPT | Mod: S$GLB,,, | Performed by: INTERNAL MEDICINE

## 2022-12-07 NOTE — PROGRESS NOTES
Chief Complaint :   Carcinoma Left Breast diagnosed     Hx of Present illness :  Patient is a 57 y.o. year old female who presents to the clinic today for annualo Oncology followup. Patient was under my care at [facility]. Dx'd to have carcinoma left Breast in Dec 2001. Underwent Bilateral mastectomy with reconstruction. Had Adjuvant chemotherapy with Taxotere, Epirubicin and Cytoxan and radiation. This was followed by five years of adjuvant harmonal therapy with Tamoxifen/Femara.  In 2019 had Sx of Uterine bleeding. In 2020, had  Had Uterine Curettage, and Bilateral salpingo oopherectomy. Benign findings.     Interval Hx: Former pt of Dr. Newton's  No new issues  Doing well  Appetite and weight stable.   No SOB/CP/cough    She is on CPAP for JOSEPHINE   She underwent bariatric surgery Oct 2022 per Dr. Torres     She has chronic LUE lymphedema   She has lymphedema pump and compression sleeves-noncompliant    Allergies :    Review of patient's allergies indicates:   Allergen Reactions    Codeine Nausea Only       Occupation :  operations    Transfusion :  None    Menstrual & obstetric Hx :   2; para 2  Age of menarche: 10  Age of first pregnancy:  23  Lactation history: no  Age of menopause:  After chemoat age 38  HRT:  none    Present Meds :   Medication List with Changes/Refills   Current Medications    ALLOPURINOL (ZYLOPRIM) 100 MG TABLET    Take 100 mg by mouth once daily.    AMLODIPINE (NORVASC) 5 MG TABLET    Take 1 tablet (5 mg total) by mouth 2 (two) times daily.    BETAMETHASONE DIPROPIONATE 0.05 % CREAM    SMARTSIG:Sparingly Topical Twice Daily PRN    CELECOXIB (CELEBREX) 200 MG CAPSULE    TAKE 1 CAPSULE BY MOUTH ONCE DAILY.    CETIRIZINE (ZYRTEC) 10 MG TABLET    Take 10 mg by mouth once daily.    ERGOCALCIFEROL (ERGOCALCIFEROL) 50,000 UNIT CAP    TAKE 1 CAPSULE BY MOUTH EVERY 7 DAYS.    ESCITALOPRAM OXALATE (LEXAPRO) 10 MG TABLET    Take 1 tablet (10 mg total) by mouth  once daily.    IPRATROPIUM (ATROVENT) 42 MCG (0.06 %) NASAL SPRAY    2 sprays by Nasal route 2 (two) times daily.    KETOCONAZOLE (NIZORAL) 2 % SHAMPOO    Apply 1 application topically twice a week.    LATANOPROST 0.005 % OPHTHALMIC SOLUTION    INSTILL ONE DROP IN EACH EYE EVERY NIGHT AT BEDTIME.    LORAZEPAM (ATIVAN) 1 MG TABLET    TAKE ONE TABLET BY MOUTH EVERY EVENING.    MINOXIDIL (LONITEN) 2.5 MG TABLET    Take 1 tablet (2.5 mg total) by mouth once daily.    VALSARTAN (DIOVAN) 320 MG TABLET    Take 320 mg by mouth once daily.       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    Past Medical Hx :  Past Medical History:   Diagnosis Date    B12 deficiency anemia     Intramuscular replacement    Benign hypertension     Breast cancer     Left-sided diagnosed 2003 status post mastectomy chemotherapy and radiation, focal atypical ductal hyperplasia of the right status post mastectomy October 2009    Chronic depression     GERD (gastroesophageal reflux disease)     Hypercalcemia syndrome     Hyperlipidemia LDL goal <130     Hyperparathyroidism due to vitamin D deficiency     Insomnia     Lumbar radiculitis     Lymphedema of arm     Morbid obesity     Vitamin D deficiency disease        Travel Hx :  N/A    Immunization :  Immunization History   Administered Date(s) Administered    COVID-19, MRNA, LN-S, PF (Pfizer) (Purple Cap) 07/23/2021, 08/13/2021    DTaP 08/27/2019    Hepatitis A, Adult 05/11/2022    Hepatitis B (recombinant) Adjuvanted, 2 dose 05/11/2022, 06/13/2022    Influenza 12/30/2013    Influenza - Quadrivalent - PF *Preferred* (6 months and older) 10/25/2022    Pneumococcal Conjugate - 13 Valent 09/09/2019    Pneumococcal Conjugate - 20 Valent 10/25/2022    Pneumococcal Polysaccharide - 23 Valent 04/10/2014       Family Hx :  Family History   Problem Relation Age of Onset    COPD Mother     Heart disease Father     Hypertension Father     Hyperlipidemia Father     COPD  "Sister     Stroke Brother     Cirrhosis Neg Hx        Social Hx :  Social History     Tobacco Use    Smoking status: Never    Smokeless tobacco: Never   Substance Use Topics    Alcohol use: No    Drug use: No       Surgery :  Back surgery; Bilateral mastectomy with reconstruction.    Symptoms :    Review of Systems   Constitutional:  Negative for chills, fever, malaise/fatigue and weight loss.   HENT:  Negative for congestion, hearing loss, nosebleeds and sore throat.    Eyes:  Negative for blurred vision and double vision.        Wears glasses. Blind Left eye   Respiratory:  Negative for cough, hemoptysis and shortness of breath.         Hx of Sleep Apnea. Uses C-PAP   Cardiovascular:  Negative for chest pain, palpitations and leg swelling.   Gastrointestinal:  Negative for abdominal pain, blood in stool, constipation, diarrhea, nausea and vomiting.   Genitourinary:  Negative for flank pain, frequency and hematuria.   Musculoskeletal:  Negative for back pain, joint pain and neck pain.   Skin:  Negative for rash.   Neurological:  Negative for dizziness and headaches.   Endo/Heme/Allergies:  Does not bruise/bleed easily.   Psychiatric/Behavioral:  Negative for depression and hallucinations. The patient is nervous/anxious. The patient does not have insomnia.    Vitals:    12/07/22 0817   BP: 127/75   BP Location: Left arm   Patient Position: Sitting   BP Method: Large (Automatic)   Pulse: (!) 59   SpO2: 95%   Weight: 115.9 kg (255 lb 8.2 oz)   Height: 5' 5" (1.651 m)         Physical Exam :   Physical Exam   Constitutional: She is oriented to person, place, and time. She appears well-developed and well-nourished.   HENT:   Head: Normocephalic.   Mouth/Throat: Oropharynx is clear and moist. No oropharyngeal exudate.   Eyes: Conjunctivae and lids are normal.  No scleral icterus.   Neck: Normal range of motion. Neck supple. No thyromegaly present.   Cardiovascular: Normal rate, regular rhythm and normal heart sounds.  "   No murmur heard.  Pulmonary/Chest: Breath sounds normal. She has no wheezes. She has no rales.   Abdominal: Soft. Bowel sounds are normal. She exhibits no distension and no mass. There is no tenderness. There is no rebound and no guarding.   Reconstructed Breasts- no masses, skin changes or axillary LAD noted   Musculoskeletal: Normal range of motion. LUE lymphedema -stable   Lymphadenopathy:     She has no cervical adenopathy.     She has no axillary adenopathy.        Right: No supraclavicular adenopathy present.        Left: No supraclavicular adenopathy present.   Neurological: She is alert and oriented to person, place, and time. No cranial nerve deficit. Coordination normal.   Skin: Skin is warm and dry. No ecchymosis, no petechiae and no rash noted. No erythema.   Psychiatric: She has a normal mood and affect.       Labs & Imaging :  Results for CHASE DUMONT (MRN [id number]) as of 9/29/2021 09:25   Ref. Range 3/14/2019 16:35 9/23/2021 07:59   CEA Latest Ref Range: 0.0 - 5.0 ng/mL <1.0 <1.0    Latest Ref Range: 0 - 30 U/mL  9   CA 27.29 Latest Ref Range: <=38.0 U/mL 20.4      Results for CHASE DUMONT (MRN [id number]) as of 10/10/2021 22:29   Ref. Range 3/14/2019 16:35 1/3/2020 12:09 9/23/2021 07:59   CEA Latest Ref Range: 0.0 - 5.0 ng/mL <1.0  <1.0     Results for CHASE DUMONT (MRN [id number]) as of 10/10/2021 22:29   Ref. Range 9/23/2021 07:59    Latest Ref Range: 0 - 30 U/mL 9       Lab Results   Component Value Date    WBC 6.98 12/01/2022    HGB 13.8 12/01/2022    HCT 40.1 12/01/2022    MCV 90 12/01/2022     12/01/2022     CT a/p w/contrast 10/25/21  Impression:     No evidence for metastatic disease.     Hepatomegaly with fatty changes of the liver.     Surgical changes of the anterior abdominal wall.       Dx :  1. Remote Hx of Carcinoma left Breast.            2. Elevated Liver enzymes           3. Fatty liver      Assessment & Plan: 1. Reviewed labs and imaging studies  with  patient.   Pt doing well.   2.,3. F/u with Hepatology   US and further w/u planned   Her ALT has been elevated since at least 2006    Cbc,cmp prior to f/u in 1y

## 2022-12-08 ENCOUNTER — OFFICE VISIT (OUTPATIENT)
Dept: HEPATOLOGY | Facility: CLINIC | Age: 57
End: 2022-12-08
Payer: COMMERCIAL

## 2022-12-08 DIAGNOSIS — R74.8 ELEVATED LIVER ENZYMES: ICD-10-CM

## 2022-12-08 DIAGNOSIS — E66.01 CLASS 3 SEVERE OBESITY DUE TO EXCESS CALORIES WITH SERIOUS COMORBIDITY AND BODY MASS INDEX (BMI) OF 40.0 TO 44.9 IN ADULT: ICD-10-CM

## 2022-12-08 DIAGNOSIS — E88.01 LOW PLASMA ALPHA-1 ANTITRYPSIN: ICD-10-CM

## 2022-12-08 DIAGNOSIS — K76.0 NAFLD (NONALCOHOLIC FATTY LIVER DISEASE): Primary | ICD-10-CM

## 2022-12-08 DIAGNOSIS — K74.01 HEPATIC FIBROSIS, EARLY FIBROSIS: ICD-10-CM

## 2022-12-08 PROCEDURE — 4010F ACE/ARB THERAPY RXD/TAKEN: CPT | Mod: CPTII,95,, | Performed by: NURSE PRACTITIONER

## 2022-12-08 PROCEDURE — 99214 PR OFFICE/OUTPT VISIT, EST, LEVL IV, 30-39 MIN: ICD-10-PCS | Mod: 95,,, | Performed by: NURSE PRACTITIONER

## 2022-12-08 PROCEDURE — 4010F PR ACE/ARB THEARPY RXD/TAKEN: ICD-10-PCS | Mod: CPTII,95,, | Performed by: NURSE PRACTITIONER

## 2022-12-08 PROCEDURE — 3044F PR MOST RECENT HEMOGLOBIN A1C LEVEL <7.0%: ICD-10-PCS | Mod: CPTII,95,, | Performed by: NURSE PRACTITIONER

## 2022-12-08 PROCEDURE — 3044F HG A1C LEVEL LT 7.0%: CPT | Mod: CPTII,95,, | Performed by: NURSE PRACTITIONER

## 2022-12-08 PROCEDURE — 99214 OFFICE O/P EST MOD 30 MIN: CPT | Mod: 95,,, | Performed by: NURSE PRACTITIONER

## 2022-12-08 NOTE — PROGRESS NOTES
The patient location is: [place], [place]  The chief complaint leading to consultation is: F/u for fatty liver    Visit type: audiovisual    Face to Face time with patient: 14 min   30 minutes of total time spent on the encounter, which includes face to face time and non-face to face time preparing to see the patient (eg, review of tests), Obtaining and/or reviewing separately obtained history, Documenting clinical information in the electronic or other health record, Independently interpreting results (not separately reported) and communicating results to the patient/family/caregiver, or Care coordination (not separately reported).     Each patient to whom he or she provides medical services by telemedicine is:  (1) informed of the relationship between the physician and patient and the respective role of any other health care provider with respect to management of the patient; and (2) notified that he or she may decline to receive medical services by telemedicine and may withdraw from such care at any time.      [facility] - Established Patient    Last Clinic Visit: 11/12/21      HISTORY     This is a 57 y.o. female with PMH noted below, here for follow-up of fatty liver.    Fatty liver first noted on CT AP 10/25/21.     Her ALT has been elevated since at least 2006, 40-60s.     Serological workup has been negative for Sami's, hemochromatosis, autoimmune etiology, and viral hepatitis. A1AT level minimally low (98).     Fibrosis staging:   MRI elastography 12/1/21: moderate to severe steatosis, F1-2 fibrosis    Health Maintenance:  -- Last imaging: CT 10/25/21 with hepatic steatosis, no liver lesions  -- Hepatitis A & B vaccination: complete    Interval history:  Feels well, no concerns today.    Had gastric sleeve surgery 10/4/22. Has lost >50 lb already. Energy level has greatly improved. She has already been able to stop some of her BP meds.    Updates on risk factors for fatty liver:   Weight --  BMI 42, previously 49                    Dyslipidemia -- mildly elevated earlier this year                               Insulin resistance / diabetes -- HgbA1c 5        Hypertension -- yes  Alcohol use -- does not typically drink    Denies symptoms of hepatic decompensation including jaundice, ascites, cognitive problems to suggest hepatic encephalopathy, or GI bleeding.         Past medical history, surgical history, problem list, family history, social history, allergies: Reviewed and updated in the appropriate section of the electronic medical record.  Pertinent findings:  h/o BRCA s/p bilateral mastectomy + adjuvant chemotherapy/radiation + 5 years of tamoxifen/femara        Current Outpatient Medications   Medication Sig Dispense Refill    allopurinol (ZYLOPRIM) 100 MG tablet Take 100 mg by mouth once daily.      amLODIPine (NORVASC) 5 MG tablet Take 1 tablet (5 mg total) by mouth 2 (two) times daily. 60 tablet 11    betamethasone dipropionate 0.05 % cream SMARTSIG:Sparingly Topical Twice Daily PRN      celecoxib (CELEBREX) 200 MG capsule TAKE 1 CAPSULE BY MOUTH ONCE DAILY. 30 capsule 5    cetirizine (ZYRTEC) 10 MG tablet Take 10 mg by mouth once daily.      ergocalciferol (ERGOCALCIFEROL) 50,000 unit Cap TAKE 1 CAPSULE BY MOUTH EVERY 7 DAYS. 12 capsule 1    EScitalopram oxalate (LEXAPRO) 10 MG tablet Take 1 tablet (10 mg total) by mouth once daily. 90 tablet 3    ipratropium (ATROVENT) 42 mcg (0.06 %) nasal spray 2 sprays by Nasal route 2 (two) times daily. 15 mL 11    ketoconazole (NIZORAL) 2 % shampoo Apply 1 application topically twice a week.      latanoprost 0.005 % ophthalmic solution INSTILL ONE DROP IN EACH EYE EVERY NIGHT AT BEDTIME.      LORazepam (ATIVAN) 1 MG tablet TAKE ONE TABLET BY MOUTH EVERY EVENING. 30 tablet 0    minoxidiL (LONITEN) 2.5 MG tablet Take 1 tablet (2.5 mg total) by mouth once daily. 90 tablet 3    valsartan (DIOVAN) 320 MG tablet Take 320 mg by mouth once daily.       No  current facility-administered medications for this visit.     Medication list reviewed and updated.      Review of Systems   As per HPI      Physical Exam   Constitutional: No distress. Alert and oriented to person, place, and time.  Pulmonary/Chest: No respiratory distress.   Skin: No jaundice.   Psychiatric: Normal mood and affect. Speech, behavior, and thought content normal. No depression or anxiety noted.       LABS & DIAGNOSTIC STUDIES     I have personally reviewed pertinent laboratory findings:    Lab Results   Component Value Date    ALT 53 (H) 12/01/2022    ALT 53 (H) 12/01/2022    AST 32 12/01/2022    AST 32 12/01/2022    ALKPHOS 109 12/01/2022    ALKPHOS 109 12/01/2022    BILITOT 0.5 12/01/2022    BILITOT 0.5 12/01/2022    ALBUMIN 3.7 12/01/2022    ALBUMIN 3.7 12/01/2022       Lab Results   Component Value Date    WBC 6.98 12/01/2022    HGB 13.8 12/01/2022    HCT 40.1 12/01/2022    MCV 90 12/01/2022     12/01/2022       Lab Results   Component Value Date     12/01/2022    K 4.3 12/01/2022    BUN 13 12/01/2022    CREATININE 0.8 12/01/2022    ESTGFRAFRICA >60 10/25/2021    EGFRNONAA >60 10/25/2021       Lab Results   Component Value Date    SMOOTHMUSCAB Negative 1:40 12/01/2021    AMAIFA Negative 1:40 12/01/2021    IGGSERUM 1571 12/01/2021    ANASCREEN Negative <1:80 12/01/2021    FERRITIN 297 07/18/2013    FESATURATED 26 07/18/2013    CERULOPLSM 30.0 12/01/2021    HEPBSAG Negative 01/06/2009    HEPBIGM Negative 01/06/2009    HEPBCAB Negative 12/01/2021    HEPCAB Negative 01/06/2009    HEPAIGM Negative 01/06/2009       No results found for: AFP    I have personally reviewed the following result reports:  CT - 10/25/21  MRI - 12/1/21      ASSESSMENT & PLAN     57 y.o. female with:    1. NAFLD with hepatic fibrosis (F1-2)  -- MRI elastography last year showed significant steatosis and mild-moderate fibrosis (F1-2). She is now s/p gastric sleeve and has already lost >50 lb. Can attempt Fibroscan  in ~6 months to reassess fibrosis staging  -- Continue to trend liver enzymes, anticipate improvement with ongoing weight loss   -- Update US prior to next visit     3. BMI 42, HLD  -- Commended on weight loss success and good control of metabolic risk factors. Encouraged to continue these efforts for treatment of fatty liver     4. Low A1AT level  -- Check phenotype with next labs        Orders Placed This Encounter   Procedures    FibroScan [place] (Vibration Controlled Transient Elastography)    US Abdomen Limited    Hepatic Function Panel    Alpha 1 Antitrypsin Phenotype         *See AVS for patient education and instructions.      Return to clinic in 6 months with labs, US, and Fibroscan prior.       Thank you for allowing me to participate in the care of Margie Pat, GENNYP-C  Hepatology

## 2022-12-08 NOTE — Clinical Note
Please schedule labs (hepatic panel, alpha 1) around the time of her US in May. F/u visit about a week later with Fibroscan. Thanks!

## 2022-12-10 NOTE — PATIENT INSTRUCTIONS
Repeat labs, ultrasound, and Fibroscan in about 6 months  Follow-up visit after testing  Congrats on the weight loss success- keep up the great work!

## 2022-12-12 ENCOUNTER — TELEPHONE (OUTPATIENT)
Dept: HEPATOLOGY | Facility: CLINIC | Age: 57
End: 2022-12-12
Payer: COMMERCIAL

## 2022-12-12 NOTE — TELEPHONE ENCOUNTER
----- Message from Naima Pat NP sent at 12/10/2022  1:36 PM CST -----  Please schedule labs (hepatic panel, alpha 1) around the time of her US in May. F/u visit about a week later with Fibroscan. Thanks!

## 2022-12-29 ENCOUNTER — TELEPHONE (OUTPATIENT)
Dept: BARIATRICS | Facility: CLINIC | Age: 57
End: 2022-12-29
Payer: COMMERCIAL

## 2022-12-30 ENCOUNTER — LAB VISIT (OUTPATIENT)
Dept: LAB | Facility: HOSPITAL | Age: 57
End: 2022-12-30
Payer: COMMERCIAL

## 2022-12-30 DIAGNOSIS — Z13.29 SCREENING FOR THYROID DISORDER: ICD-10-CM

## 2022-12-30 DIAGNOSIS — Z13.0 SCREENING FOR IRON DEFICIENCY ANEMIA: ICD-10-CM

## 2022-12-30 DIAGNOSIS — E56.9 MULTIPLE VITAMIN DEFICIENCY DISEASE: ICD-10-CM

## 2022-12-30 DIAGNOSIS — R53.83 FATIGUE: Primary | ICD-10-CM

## 2022-12-30 DIAGNOSIS — Z13.220 SCREENING FOR LIPOID DISORDERS: ICD-10-CM

## 2022-12-30 DIAGNOSIS — R53.83 FATIGUE: ICD-10-CM

## 2022-12-30 DIAGNOSIS — Z79.899 ENCOUNTER FOR LONG-TERM (CURRENT) USE OF OTHER MEDICATIONS: ICD-10-CM

## 2022-12-30 DIAGNOSIS — Z13.21 SCREENING FOR MALNUTRITION: ICD-10-CM

## 2022-12-30 LAB
ALBUMIN SERPL BCP-MCNC: 3.8 G/DL (ref 3.5–5.2)
ALP SERPL-CCNC: 109 U/L (ref 55–135)
ALT SERPL W/O P-5'-P-CCNC: 25 U/L (ref 10–44)
ANION GAP SERPL CALC-SCNC: 9 MMOL/L (ref 8–16)
AST SERPL-CCNC: 22 U/L (ref 10–40)
BASOPHILS # BLD AUTO: 0.05 K/UL (ref 0–0.2)
BASOPHILS NFR BLD: 0.6 % (ref 0–1.9)
BILIRUB SERPL-MCNC: 0.5 MG/DL (ref 0.1–1)
BUN SERPL-MCNC: 16 MG/DL (ref 6–20)
CALCIUM SERPL-MCNC: 10 MG/DL (ref 8.7–10.5)
CHLORIDE SERPL-SCNC: 104 MMOL/L (ref 95–110)
CO2 SERPL-SCNC: 26 MMOL/L (ref 23–29)
CREAT SERPL-MCNC: 0.8 MG/DL (ref 0.5–1.4)
DIFFERENTIAL METHOD: NORMAL
EOSINOPHIL # BLD AUTO: 0.2 K/UL (ref 0–0.5)
EOSINOPHIL NFR BLD: 2.2 % (ref 0–8)
ERYTHROCYTE [DISTWIDTH] IN BLOOD BY AUTOMATED COUNT: 12.7 % (ref 11.5–14.5)
EST. GFR  (NO RACE VARIABLE): >60 ML/MIN/1.73 M^2
GLUCOSE SERPL-MCNC: 82 MG/DL (ref 70–110)
HCT VFR BLD AUTO: 40.3 % (ref 37–48.5)
HGB BLD-MCNC: 13.5 G/DL (ref 12–16)
IMM GRANULOCYTES # BLD AUTO: 0.01 K/UL (ref 0–0.04)
IMM GRANULOCYTES NFR BLD AUTO: 0.1 % (ref 0–0.5)
LYMPHOCYTES # BLD AUTO: 2.2 K/UL (ref 1–4.8)
LYMPHOCYTES NFR BLD: 26.3 % (ref 18–48)
MAGNESIUM SERPL-MCNC: 1.9 MG/DL (ref 1.6–2.6)
MCH RBC QN AUTO: 31 PG (ref 27–31)
MCHC RBC AUTO-ENTMCNC: 33.5 G/DL (ref 32–36)
MCV RBC AUTO: 92 FL (ref 82–98)
MONOCYTES # BLD AUTO: 0.6 K/UL (ref 0.3–1)
MONOCYTES NFR BLD: 7.2 % (ref 4–15)
NEUTROPHILS # BLD AUTO: 5.3 K/UL (ref 1.8–7.7)
NEUTROPHILS NFR BLD: 63.6 % (ref 38–73)
NRBC BLD-RTO: 0 /100 WBC
PLATELET # BLD AUTO: 242 K/UL (ref 150–450)
PMV BLD AUTO: 11.2 FL (ref 9.2–12.9)
POTASSIUM SERPL-SCNC: 4 MMOL/L (ref 3.5–5.1)
PROT SERPL-MCNC: 7.5 G/DL (ref 6–8.4)
RBC # BLD AUTO: 4.36 M/UL (ref 4–5.4)
SODIUM SERPL-SCNC: 139 MMOL/L (ref 136–145)
VIT B12 SERPL-MCNC: 496 PG/ML (ref 210–950)
WBC # BLD AUTO: 8.28 K/UL (ref 3.9–12.7)

## 2022-12-30 PROCEDURE — 82607 VITAMIN B-12: CPT | Performed by: NURSE PRACTITIONER

## 2022-12-30 PROCEDURE — 85025 COMPLETE CBC W/AUTO DIFF WBC: CPT | Performed by: NURSE PRACTITIONER

## 2022-12-30 PROCEDURE — 84425 ASSAY OF VITAMIN B-1: CPT | Performed by: NURSE PRACTITIONER

## 2022-12-30 PROCEDURE — 80053 COMPREHEN METABOLIC PANEL: CPT | Performed by: NURSE PRACTITIONER

## 2022-12-30 PROCEDURE — 82652 VIT D 1 25-DIHYDROXY: CPT | Performed by: NURSE PRACTITIONER

## 2022-12-30 PROCEDURE — 83735 ASSAY OF MAGNESIUM: CPT | Performed by: NURSE PRACTITIONER

## 2023-01-03 LAB — 1,25(OH)2D3 SERPL-MCNC: 53 PG/ML (ref 20–79)

## 2023-01-04 LAB — VIT B1 BLD-MCNC: 83 UG/L (ref 38–122)

## 2023-01-18 ENCOUNTER — PATIENT MESSAGE (OUTPATIENT)
Dept: ADMINISTRATIVE | Facility: HOSPITAL | Age: 58
End: 2023-01-18
Payer: COMMERCIAL

## 2023-01-25 ENCOUNTER — PATIENT MESSAGE (OUTPATIENT)
Dept: PRIMARY CARE CLINIC | Facility: CLINIC | Age: 58
End: 2023-01-25
Payer: COMMERCIAL

## 2023-02-17 ENCOUNTER — PATIENT MESSAGE (OUTPATIENT)
Dept: BARIATRICS | Facility: CLINIC | Age: 58
End: 2023-02-17
Payer: COMMERCIAL

## 2023-03-08 ENCOUNTER — PATIENT MESSAGE (OUTPATIENT)
Dept: PRIMARY CARE CLINIC | Facility: CLINIC | Age: 58
End: 2023-03-08
Payer: COMMERCIAL

## 2023-03-08 DIAGNOSIS — G47.01 INSOMNIA DUE TO MEDICAL CONDITION: ICD-10-CM

## 2023-03-10 RX ORDER — LORAZEPAM 1 MG/1
1 TABLET ORAL NIGHTLY
Qty: 30 TABLET | Refills: 0 | OUTPATIENT
Start: 2023-03-10

## 2023-04-10 ENCOUNTER — PATIENT MESSAGE (OUTPATIENT)
Dept: PRIMARY CARE CLINIC | Facility: CLINIC | Age: 58
End: 2023-04-10
Payer: COMMERCIAL

## 2023-04-11 DIAGNOSIS — G47.01 INSOMNIA DUE TO MEDICAL CONDITION: ICD-10-CM

## 2023-04-12 ENCOUNTER — PATIENT MESSAGE (OUTPATIENT)
Dept: ADMINISTRATIVE | Facility: HOSPITAL | Age: 58
End: 2023-04-12
Payer: COMMERCIAL

## 2023-04-12 RX ORDER — LORAZEPAM 1 MG/1
1 TABLET ORAL NIGHTLY
Qty: 30 TABLET | Refills: 0 | OUTPATIENT
Start: 2023-04-12

## 2023-04-13 ENCOUNTER — PATIENT MESSAGE (OUTPATIENT)
Dept: PRIMARY CARE CLINIC | Facility: CLINIC | Age: 58
End: 2023-04-13
Payer: COMMERCIAL

## 2023-04-14 ENCOUNTER — OFFICE VISIT (OUTPATIENT)
Dept: PRIMARY CARE CLINIC | Facility: CLINIC | Age: 58
End: 2023-04-14
Payer: COMMERCIAL

## 2023-04-14 DIAGNOSIS — Z79.899 CHRONIC PRESCRIPTION BENZODIAZEPINE USE: Primary | ICD-10-CM

## 2023-04-14 DIAGNOSIS — G47.01 INSOMNIA DUE TO MEDICAL CONDITION: ICD-10-CM

## 2023-04-14 PROCEDURE — 99213 PR OFFICE/OUTPT VISIT, EST, LEVL III, 20-29 MIN: ICD-10-PCS | Mod: 95,,, | Performed by: STUDENT IN AN ORGANIZED HEALTH CARE EDUCATION/TRAINING PROGRAM

## 2023-04-14 PROCEDURE — 99213 OFFICE O/P EST LOW 20 MIN: CPT | Mod: 95,,, | Performed by: STUDENT IN AN ORGANIZED HEALTH CARE EDUCATION/TRAINING PROGRAM

## 2023-04-14 RX ORDER — LORAZEPAM 1 MG/1
1 TABLET ORAL NIGHTLY
Qty: 30 TABLET | Refills: 0 | Status: SHIPPED | OUTPATIENT
Start: 2023-06-14 | End: 2023-07-14

## 2023-04-14 RX ORDER — LORAZEPAM 1 MG/1
1 TABLET ORAL NIGHTLY
Qty: 30 TABLET | Refills: 0 | Status: SHIPPED | OUTPATIENT
Start: 2023-05-14 | End: 2023-06-13

## 2023-04-14 RX ORDER — LORAZEPAM 1 MG/1
1 TABLET ORAL NIGHTLY
Qty: 30 TABLET | Refills: 0 | Status: SHIPPED | OUTPATIENT
Start: 2023-04-14 | End: 2023-07-17 | Stop reason: SDUPTHER

## 2023-04-14 NOTE — PROGRESS NOTES
04/14/2023    Margie Cifuentes  [id number]    CC: medication refill    HP    This pt is known to me and presents virtually for medication refill. PMH sig for insomnia, HTN, and depression. Since last visit, with 75 lb weight loss from bariatric surgery.  States that she has hit a plateau, but still trying hard to loose weight. Blood pressure has ashley well controlled with amlodipine 5 mg and minoxidil. Request refill of ativan for sleep; has not slept in 4 nights without it.       Negative 10 point ROS outside of HPI    Social History     Socioeconomic History    Marital status:     Number of children: 2    Years of education: [employer]   Occupational History    Occupation: Works for a [employer] company     Employer: [employer]   Tobacco Use    Smoking status: Never    Smokeless tobacco: Never   Substance and Sexual Activity    Alcohol use: No    Drug use: No    Sexual activity: Yes     Partners: Male   Social History Narrative    Adult Screenings updated and reviewed  4/10/14    Mammogram( for females) history of bilateral mastectomy    Pap ( for females)- years      Colonoscopy - done 2006 normal    Flu shot - 2013    Td 2005    Pneumovax updated 4/10/14 due again  2019    Zostavax recommended one time at  age  59    Eye exam  Overdue     Bone density overdue            Current Outpatient Medications:     allopurinol (ZYLOPRIM) 100 MG tablet, Take 100 mg by mouth once daily., Disp: , Rfl:     amLODIPine (NORVASC) 5 MG tablet, Take 1 tablet (5 mg total) by mouth 2 (two) times daily., Disp: 60 tablet, Rfl: 11    betamethasone dipropionate 0.05 % cream, SMARTSIG:Sparingly Topical Twice Daily PRN, Disp: , Rfl:     celecoxib (CELEBREX) 200 MG capsule, TAKE 1 CAPSULE BY MOUTH ONCE DAILY., Disp: 30 capsule, Rfl: 5    cetirizine (ZYRTEC) 10 MG tablet, Take 10 mg by mouth once daily., Disp: , Rfl:     ergocalciferol (ERGOCALCIFEROL) 50,000 unit Cap, TAKE 1 CAPSULE BY MOUTH EVERY 7 DAYS., Disp: 12 capsule, Rfl: 1     EScitalopram oxalate (LEXAPRO) 10 MG tablet, Take 1 tablet (10 mg total) by mouth once daily., Disp: 90 tablet, Rfl: 3    ipratropium (ATROVENT) 42 mcg (0.06 %) nasal spray, 2 sprays by Nasal route 2 (two) times daily., Disp: 15 mL, Rfl: 11    latanoprost 0.005 % ophthalmic solution, INSTILL ONE DROP IN EACH EYE EVERY NIGHT AT BEDTIME., Disp: , Rfl:     LORazepam (ATIVAN) 1 MG tablet, Take 1 tablet (1 mg total) by mouth every evening., Disp: 30 tablet, Rfl: 0    minoxidiL (LONITEN) 2.5 MG tablet, Take 1 tablet (2.5 mg total) by mouth once daily., Disp: 90 tablet, Rfl: 3    valsartan (DIOVAN) 320 MG tablet, Take 320 mg by mouth once daily., Disp: , Rfl:       Physical Exam  Vitals unable to obtain    Gen: well appearing  Resp: speaks in full sentences. Non labored breathing  Cv: non-diaphoretic    1. Insomnia due to medical condition  Stable   reviewed  - LORazepam (ATIVAN) 1 MG tablet; Take 1 tablet (1 mg total) by mouth every evening.  Dispense: 30 tablet; Refill: 0  - LORazepam (ATIVAN) 1 MG tablet; Take 1 tablet (1 mg total) by mouth every evening.  Dispense: 30 tablet; Refill: 0  - LORazepam (ATIVAN) 1 MG tablet; Take 1 tablet (1 mg total) by mouth every evening.  Dispense: 30 tablet; Refill: 0    2. Chronic prescription benzodiazepine use  - LORazepam (ATIVAN) 1 MG tablet; Take 1 tablet (1 mg total) by mouth every evening.  Dispense: 30 tablet; Refill: 0  - LORazepam (ATIVAN) 1 MG tablet; Take 1 tablet (1 mg total) by mouth every evening.  Dispense: 30 tablet; Refill: 0  - LORazepam (ATIVAN) 1 MG tablet; Take 1 tablet (1 mg total) by mouth every evening.  Dispense: 30 tablet; Refill: 0      RTC in 3 months.     Klaudia Fernandez MD  Family Medicine

## 2023-05-03 ENCOUNTER — HOSPITAL ENCOUNTER (OUTPATIENT)
Dept: RADIOLOGY | Facility: HOSPITAL | Age: 58
Discharge: HOME | End: 2023-05-03
Attending: NURSE PRACTITIONER
Payer: COMMERCIAL

## 2023-05-03 DIAGNOSIS — K76.0 NAFLD (NONALCOHOLIC FATTY LIVER DISEASE): ICD-10-CM

## 2023-05-03 DIAGNOSIS — K74.01 HEPATIC FIBROSIS, EARLY FIBROSIS: ICD-10-CM

## 2023-05-03 PROCEDURE — 76705 ECHO EXAM OF ABDOMEN: CPT | Mod: 26,,, | Performed by: RADIOLOGY

## 2023-05-03 PROCEDURE — 76705 ECHO EXAM OF ABDOMEN: CPT | Mod: TC

## 2023-05-03 PROCEDURE — 76705 US ABDOMEN LIMITED: ICD-10-PCS | Mod: 26,,, | Performed by: RADIOLOGY

## 2023-05-10 ENCOUNTER — PROCEDURE VISIT (OUTPATIENT)
Dept: HEPATOLOGY | Facility: CLINIC | Age: 58
End: 2023-05-10
Payer: COMMERCIAL

## 2023-05-10 ENCOUNTER — OFFICE VISIT (OUTPATIENT)
Dept: HEPATOLOGY | Facility: CLINIC | Age: 58
End: 2023-05-10
Payer: COMMERCIAL

## 2023-05-10 VITALS — BODY MASS INDEX: 41.87 KG/M2 | WEIGHT: 251.31 LBS | HEIGHT: 65 IN

## 2023-05-10 DIAGNOSIS — E88.01 LOW PLASMA ALPHA-1 ANTITRYPSIN: ICD-10-CM

## 2023-05-10 DIAGNOSIS — E78.5 HYPERLIPIDEMIA LDL GOAL <130: ICD-10-CM

## 2023-05-10 DIAGNOSIS — K76.0 NAFLD (NONALCOHOLIC FATTY LIVER DISEASE): Primary | ICD-10-CM

## 2023-05-10 DIAGNOSIS — K74.01 HEPATIC FIBROSIS, EARLY FIBROSIS: ICD-10-CM

## 2023-05-10 DIAGNOSIS — K76.0 NAFLD (NONALCOHOLIC FATTY LIVER DISEASE): ICD-10-CM

## 2023-05-10 DIAGNOSIS — I10 BENIGN HYPERTENSION: ICD-10-CM

## 2023-05-10 DIAGNOSIS — E66.01 CLASS 3 SEVERE OBESITY DUE TO EXCESS CALORIES WITH SERIOUS COMORBIDITY AND BODY MASS INDEX (BMI) OF 40.0 TO 44.9 IN ADULT: ICD-10-CM

## 2023-05-10 PROBLEM — R74.8 ELEVATED LIVER ENZYMES: Status: RESOLVED | Noted: 2020-11-10 | Resolved: 2023-05-10

## 2023-05-10 PROCEDURE — 76981 FIBROSCAN NEW ORLEANS (VIBRATION CONTROLLED TRANSIENT ELASTOGRAPHY): ICD-10-PCS | Mod: S$GLB,,, | Performed by: NURSE PRACTITIONER

## 2023-05-10 PROCEDURE — 1159F PR MEDICATION LIST DOCUMENTED IN MEDICAL RECORD: ICD-10-PCS | Mod: CPTII,S$GLB,, | Performed by: NURSE PRACTITIONER

## 2023-05-10 PROCEDURE — 99214 PR OFFICE/OUTPT VISIT, EST, LEVL IV, 30-39 MIN: ICD-10-PCS | Mod: S$GLB,,, | Performed by: NURSE PRACTITIONER

## 2023-05-10 PROCEDURE — 99999 PR PBB SHADOW E&M-EST. PATIENT-LVL III: ICD-10-PCS | Mod: PBBFAC,,, | Performed by: NURSE PRACTITIONER

## 2023-05-10 PROCEDURE — 99214 OFFICE O/P EST MOD 30 MIN: CPT | Mod: S$GLB,,, | Performed by: NURSE PRACTITIONER

## 2023-05-10 PROCEDURE — 1159F MED LIST DOCD IN RCRD: CPT | Mod: CPTII,S$GLB,, | Performed by: NURSE PRACTITIONER

## 2023-05-10 PROCEDURE — 3008F PR BODY MASS INDEX (BMI) DOCUMENTED: ICD-10-PCS | Mod: CPTII,S$GLB,, | Performed by: NURSE PRACTITIONER

## 2023-05-10 PROCEDURE — 76981 USE PARENCHYMA: CPT | Mod: S$GLB,,, | Performed by: NURSE PRACTITIONER

## 2023-05-10 PROCEDURE — 99999 PR PBB SHADOW E&M-EST. PATIENT-LVL III: CPT | Mod: PBBFAC,,, | Performed by: NURSE PRACTITIONER

## 2023-05-10 PROCEDURE — 3008F BODY MASS INDEX DOCD: CPT | Mod: CPTII,S$GLB,, | Performed by: NURSE PRACTITIONER

## 2023-05-10 RX ORDER — PANTOPRAZOLE SODIUM 40 MG/1
40 TABLET, DELAYED RELEASE ORAL
COMMUNITY
Start: 2023-03-28 | End: 2023-06-30 | Stop reason: SDUPTHER

## 2023-05-10 NOTE — PROGRESS NOTES
[facility] - Established Patient    Last Clinic Visit: 12/8/22      HISTORY     This is a 58 y.o. female with PMH noted below, here for follow-up of fatty liver with hepatic fibrosis.    Fatty liver first noted on CT AP 10/25/21.     Her ALT has been elevated since at least 2006, 40-60s.     Serological workup has been negative for Sami's, hemochromatosis, autoimmune etiology, and viral hepatitis.     A1AT level mildly low, phenotype MZ.  Has had PFTs at [facility] ~3 years ago; reports testing was slightly abnormal.     Fibrosis staging:   MRI elastography 12/1/21: moderate to severe steatosis, F1-2 fibrosis  Fibroscan today = F2 (kPa 8.5), S3     Interval history:  Feels well, no concerns today.    With weight loss, liver enzymes remain normal.     Updates on risk factors for fatty liver:     Weight -- Body mass index is 41.82 kg/m².     Had gastric sleeve surgery 10/4/22  Has lost ~60 lb                    Dyslipidemia -- well controlled                                Insulin resistance / diabetes -- no         Hypertension -- well controlled  Alcohol use -- does not typically drink    No symptoms of hepatic decompensation including jaundice, ascites, cognitive problems to suggest hepatic encephalopathy, or GI bleeding.     Health Maintenance:  -- Last imaging: abd US 5/3/23 without focal hepatic lesion  -- Hepatitis A & B vaccination: complete          Past medical history, surgical history, problem list, family history, social history, allergies: Reviewed and updated in the appropriate section of the electronic medical record.  Pertinent findings:  h/o BRCA s/p bilateral mastectomy + adjuvant chemotherapy/radiation + 5 years of tamoxifen/femara        Current Outpatient Medications   Medication Sig Dispense Refill    amLODIPine (NORVASC) 5 MG tablet Take 1 tablet (5 mg total) by mouth 2 (two) times daily. 60 tablet 11    betamethasone dipropionate 0.05 % cream SMARTSIG:Sparingly Topical Twice Daily  PRN      celecoxib (CELEBREX) 200 MG capsule TAKE 1 CAPSULE BY MOUTH ONCE DAILY. 30 capsule 5    cetirizine (ZYRTEC) 10 MG tablet Take 10 mg by mouth once daily.      EScitalopram oxalate (LEXAPRO) 10 MG tablet Take 1 tablet (10 mg total) by mouth once daily. 90 tablet 3    ipratropium (ATROVENT) 42 mcg (0.06 %) nasal spray 2 sprays by Nasal route 2 (two) times daily. 15 mL 11    latanoprost 0.005 % ophthalmic solution INSTILL ONE DROP IN EACH EYE EVERY NIGHT AT BEDTIME.      LORazepam (ATIVAN) 1 MG tablet Take 1 tablet (1 mg total) by mouth every evening. 30 tablet 0    [START ON 5/14/2023] LORazepam (ATIVAN) 1 MG tablet Take 1 tablet (1 mg total) by mouth every evening. 30 tablet 0    [START ON 6/14/2023] LORazepam (ATIVAN) 1 MG tablet Take 1 tablet (1 mg total) by mouth every evening. 30 tablet 0    minoxidiL (LONITEN) 2.5 MG tablet Take 1 tablet (2.5 mg total) by mouth once daily. 90 tablet 3    pantoprazole (PROTONIX) 40 MG tablet Take 40 mg by mouth.       No current facility-administered medications for this visit.     Medication list reviewed and updated.      Review of Systems - as per HPI  Constitutional: Negative for fatigue or unexpected weight change.   Respiratory: Negative for shortness of breath.    Cardiovascular: Negative for leg swelling.  Gastrointestinal: Negative for abdominal distention or abdominal pain. Negative for melena or hematemesis.  Musculoskeletal: Negative for myalgias.    Skin: Negative for itching.  Neurological: Negative for confusion or slowed mentation. Negative for tremors.   Hematological: Does not bruise/bleed easily.   Psychiatric: Negative for sleep disturbance.      Physical Exam   Constitutional: Well-nourished. No distress. Alert and oriented.  Eyes: No scleral icterus.   Pulmonary/Chest: Respiratory effort normal. No respiratory distress.   Abdominal: No distension, no ascites appreciated.   Extremities: No edema.   Neurological: No tremor or asterixis. Gait  normal.  Skin: No jaundice. No spider telangiectasias or palmar erythema.  Psychiatric: Normal mood and affect. Speech, behavior, and thought content normal. No depression or anxiety noted.          LABS & DIAGNOSTIC STUDIES     I have personally reviewed pertinent laboratory findings:    Lab Results   Component Value Date    ALT 23 05/03/2023    AST 20 05/03/2023    ALKPHOS 118 05/03/2023    BILITOT 0.5 05/03/2023    ALBUMIN 3.9 05/03/2023       Lab Results   Component Value Date    WBC 8.28 12/30/2022    HGB 13.5 12/30/2022    HCT 40.3 12/30/2022    MCV 92 12/30/2022     12/30/2022       Lab Results   Component Value Date     12/30/2022    K 4.0 12/30/2022    BUN 16 12/30/2022    CREATININE 0.8 12/30/2022    ESTGFRAFRICA >60 10/25/2021    EGFRNONAA >60 10/25/2021       Lab Results   Component Value Date    SMOOTHMUSCAB Negative 1:40 12/01/2021    AMAIFA Negative 1:40 12/01/2021    IGGSERUM 1571 12/01/2021    ANASCREEN Negative <1:80 12/01/2021    FERRITIN 297 07/18/2013    FESATURATED 26 07/18/2013    XSQZA0HVLTEN SEE BELOW 05/03/2023    RTIWA1STIAKN 92 (L) 05/03/2023    CERULOPLSM 30.0 12/01/2021    HEPBSAG Negative 01/06/2009    HEPBIGM Negative 01/06/2009    HEPBCAB Negative 12/01/2021    HEPCAB Negative 01/06/2009    HEPAIGM Negative 01/06/2009       No results found for: AFP    I have personally reviewed the following result reports:  CT - 10/25/21  MRI - 12/1/21  US - 5/3/23      ASSESSMENT & PLAN     58 y.o. female with:    1. NAFLD with hepatic fibrosis, F2  -- Her liver enzymes have normalized with weight loss. Commended on her success and encouraged to continue these efforts  -- Recommend labs to monitor liver enzymes/LFTs 1-2 times per year, can include labs with PCP  -- Fibroscan consistent with MRI elasto and her fibrosis staging has ranged F1-2. Discussed goal of preventing fibrosis progression  -- Repeat Fibroscan and US annually     Reminded that the only treatment for fatty liver is  weight loss, maintaining good control of metabolic risk factors (blood pressure, cholesterol, and blood sugar), and moderating/decreasing alcohol use.      2. Body mass index is 41.82 kg/m²., HTN, HLD    3. Low A1AT level  -- Phenotype MZ. Level minimally low. Unlikely to have clinical significance from liver standpoint. She has seen Pulmonology in the past with PFTs       Orders Placed This Encounter   Procedures    FibroScan [place] (Vibration Controlled Transient Elastography)    US Abdomen Limited         *See AVS for patient education and instructions.      Return to clinic in 1 year with US and Fibroscan.      Thank you for allowing me to participate in the care of Margie Pat, GENNYP-C  Hepatology        Duration of encounter: 30 min  This includes face to face time and non-face to face time preparing to see the patient (eg, review of tests), obtaining and/or reviewing separately obtained history, documenting clinical information in the electronic or other health record, independently interpreting results and communicating results to the patient/family/caregiver, or care coordination.

## 2023-05-10 NOTE — PROCEDURES
FibroScan [place] (Vibration Controlled Transient Elastography)    Date/Time: 5/10/2023 8:30 AM  Performed by: Naima Pat NP  Authorized by: Naima Pat NP     Diagnosis:  NAFLD    Probe:  XL    Universal Protocol: Patient's identity, procedure and site were verified, confirmatory pause was performed.  Discussed procedure including risks and potential complications.  Questions answered.  Patient verbalizes understanding and wishes to proceed with VCTE.     Procedure: After providing explanations of the procedure, patient was placed in the supine position with right arm in maximum abduction to allow optimal exposure of right lateral abdomen.  Patient was briefly assessed, Testing was performed in the mid-axillary location, 50Hz Shear Wave pulses were applied and the resulting Shear Wave and Propagation Speed detected with a 3.5 MHz ultrasonic signal, using the FibroScan probe, Skin to liver capsule distance and liver parenchyma were accessed during the entire examination with the FibroScan probe, Patient was instructed to breathe normally and to abstain from sudden movements during the procedure, allowing for random measurements of liver stiffness. At least 10 Shear Waves were produced, Individual measurements of each Shear Wave were calculated.  Patient tolerated the procedure well with no complications.  Meets discharge criteria as was dismissed.  Rates pain 0 out of 10.  Patient will follow up with ordering provider to review results.    Findings  Median liver stiffness score:  8.5  CAP Reading: dB/m:  341    IQR/med %:  7  Interpretation  Fibrosis interpretation is based on medial liver stiffness - Kilopascal (kPa).    Fibrosis Stage:  F2  Steatosis interpretation is based on controlled attenuation parameter - (dB/m).    Steatosis Grade:  S3

## 2023-05-15 ENCOUNTER — PATIENT MESSAGE (OUTPATIENT)
Dept: PRIMARY CARE CLINIC | Facility: CLINIC | Age: 58
End: 2023-05-15
Payer: COMMERCIAL

## 2023-06-30 ENCOUNTER — OFFICE VISIT (OUTPATIENT)
Dept: FAMILY MEDICINE | Facility: CLINIC | Age: 58
End: 2023-06-30
Payer: COMMERCIAL

## 2023-06-30 VITALS
BODY MASS INDEX: 42.33 KG/M2 | DIASTOLIC BLOOD PRESSURE: 70 MMHG | HEART RATE: 79 BPM | WEIGHT: 254.06 LBS | SYSTOLIC BLOOD PRESSURE: 128 MMHG | HEIGHT: 65 IN | OXYGEN SATURATION: 95 % | TEMPERATURE: 98 F

## 2023-06-30 DIAGNOSIS — K21.9 GASTROESOPHAGEAL REFLUX DISEASE, UNSPECIFIED WHETHER ESOPHAGITIS PRESENT: ICD-10-CM

## 2023-06-30 DIAGNOSIS — Z00.00 ENCOUNTER FOR MEDICAL EXAMINATION TO ESTABLISH CARE: Primary | ICD-10-CM

## 2023-06-30 DIAGNOSIS — Z00.00 ANNUAL PHYSICAL EXAM: ICD-10-CM

## 2023-06-30 DIAGNOSIS — Z23 NEED FOR SHINGLES VACCINE: ICD-10-CM

## 2023-06-30 DIAGNOSIS — K76.0 NAFLD (NONALCOHOLIC FATTY LIVER DISEASE): ICD-10-CM

## 2023-06-30 DIAGNOSIS — K74.00 HEPATIC FIBROSIS: ICD-10-CM

## 2023-06-30 DIAGNOSIS — F51.04 PSYCHOPHYSIOLOGICAL INSOMNIA: ICD-10-CM

## 2023-06-30 DIAGNOSIS — I10 ESSENTIAL HYPERTENSION: ICD-10-CM

## 2023-06-30 DIAGNOSIS — F32.9 CURRENT EPISODE OF MAJOR DEPRESSIVE DISORDER WITHOUT PRIOR EPISODE, UNSPECIFIED DEPRESSION EPISODE SEVERITY: ICD-10-CM

## 2023-06-30 PROCEDURE — 3078F PR MOST RECENT DIASTOLIC BLOOD PRESSURE < 80 MM HG: ICD-10-PCS | Mod: CPTII,S$GLB,, | Performed by: FAMILY MEDICINE

## 2023-06-30 PROCEDURE — 99396 PREV VISIT EST AGE 40-64: CPT | Mod: 25,S$GLB,, | Performed by: FAMILY MEDICINE

## 2023-06-30 PROCEDURE — 1159F MED LIST DOCD IN RCRD: CPT | Mod: CPTII,S$GLB,, | Performed by: FAMILY MEDICINE

## 2023-06-30 PROCEDURE — 99999 PR PBB SHADOW E&M-EST. PATIENT-LVL IV: ICD-10-PCS | Mod: PBBFAC,,, | Performed by: FAMILY MEDICINE

## 2023-06-30 PROCEDURE — 90750 ZOSTER RECOMBINANT VACCINE: ICD-10-PCS | Mod: S$GLB,,, | Performed by: FAMILY MEDICINE

## 2023-06-30 PROCEDURE — 1160F RVW MEDS BY RX/DR IN RCRD: CPT | Mod: CPTII,S$GLB,, | Performed by: FAMILY MEDICINE

## 2023-06-30 PROCEDURE — 1160F PR REVIEW ALL MEDS BY PRESCRIBER/CLIN PHARMACIST DOCUMENTED: ICD-10-PCS | Mod: CPTII,S$GLB,, | Performed by: FAMILY MEDICINE

## 2023-06-30 PROCEDURE — 99214 PR OFFICE/OUTPT VISIT, EST, LEVL IV, 30-39 MIN: ICD-10-PCS | Mod: 25,S$GLB,, | Performed by: FAMILY MEDICINE

## 2023-06-30 PROCEDURE — 99214 OFFICE O/P EST MOD 30 MIN: CPT | Mod: 25,S$GLB,, | Performed by: FAMILY MEDICINE

## 2023-06-30 PROCEDURE — 99396 PR PREVENTIVE VISIT,EST,40-64: ICD-10-PCS | Mod: 25,S$GLB,, | Performed by: FAMILY MEDICINE

## 2023-06-30 PROCEDURE — 90471 IMMUNIZATION ADMIN: CPT | Mod: S$GLB,,, | Performed by: FAMILY MEDICINE

## 2023-06-30 PROCEDURE — 1159F PR MEDICATION LIST DOCUMENTED IN MEDICAL RECORD: ICD-10-PCS | Mod: CPTII,S$GLB,, | Performed by: FAMILY MEDICINE

## 2023-06-30 PROCEDURE — 90750 HZV VACC RECOMBINANT IM: CPT | Mod: S$GLB,,, | Performed by: FAMILY MEDICINE

## 2023-06-30 PROCEDURE — 3008F PR BODY MASS INDEX (BMI) DOCUMENTED: ICD-10-PCS | Mod: CPTII,S$GLB,, | Performed by: FAMILY MEDICINE

## 2023-06-30 PROCEDURE — 3074F PR MOST RECENT SYSTOLIC BLOOD PRESSURE < 130 MM HG: ICD-10-PCS | Mod: CPTII,S$GLB,, | Performed by: FAMILY MEDICINE

## 2023-06-30 PROCEDURE — 3008F BODY MASS INDEX DOCD: CPT | Mod: CPTII,S$GLB,, | Performed by: FAMILY MEDICINE

## 2023-06-30 PROCEDURE — 90471 ZOSTER RECOMBINANT VACCINE: ICD-10-PCS | Mod: S$GLB,,, | Performed by: FAMILY MEDICINE

## 2023-06-30 PROCEDURE — 3074F SYST BP LT 130 MM HG: CPT | Mod: CPTII,S$GLB,, | Performed by: FAMILY MEDICINE

## 2023-06-30 PROCEDURE — 3078F DIAST BP <80 MM HG: CPT | Mod: CPTII,S$GLB,, | Performed by: FAMILY MEDICINE

## 2023-06-30 PROCEDURE — 99999 PR PBB SHADOW E&M-EST. PATIENT-LVL IV: CPT | Mod: PBBFAC,,, | Performed by: FAMILY MEDICINE

## 2023-06-30 RX ORDER — ESCITALOPRAM OXALATE 20 MG/1
20 TABLET ORAL DAILY
Qty: 30 TABLET | Refills: 11 | Status: SHIPPED | OUTPATIENT
Start: 2023-06-30 | End: 2024-06-29

## 2023-06-30 RX ORDER — PANTOPRAZOLE SODIUM 40 MG/1
40 TABLET, DELAYED RELEASE ORAL DAILY
Qty: 30 TABLET | Refills: 11 | Status: SHIPPED | OUTPATIENT
Start: 2023-06-30 | End: 2023-07-03

## 2023-06-30 NOTE — PROGRESS NOTES
Assessment & Plan:    Encounter for medical examination to establish care    Annual physical exam  -     CBC Auto Differential; Future; Expected date: 06/30/2023  -     Comprehensive Metabolic Panel; Future; Expected date: 06/30/2023  -     Hemoglobin A1C; Future; Expected date: 06/30/2023  -     Lipid Panel; Future; Expected date: 06/30/2023  -     HIV 1/2 Ag/Ab (4th Gen); Future; Expected date: 06/30/2023    Fasting labs to be scheduled when she is due in 1 month.    Current episode of major depressive disorder without prior episode, unspecified depression episode severity  -     EScitalopram oxalate (LEXAPRO) 20 MG tablet; Take 1 tablet (20 mg total) by mouth once daily.  Dispense: 30 tablet; Refill: 11    Increase Lexapro to 20 mg. Allow up to 6 weeks to reach full efficacy.    Psychophysiological insomnia  Well controlled with lorazepam.    Essential hypertension  -     CBC Auto Differential; Future; Expected date: 06/30/2023  -     Comprehensive Metabolic Panel; Future; Expected date: 06/30/2023  -     Hemoglobin A1C; Future; Expected date: 06/30/2023  -     Lipid Panel; Future; Expected date: 06/30/2023    Controlled. Continue current therapy.     NAFLD (nonalcoholic fatty liver disease)  -     Comprehensive Metabolic Panel; Future; Expected date: 06/30/2023    Hepatic fibrosis  -     Comprehensive Metabolic Panel; Future; Expected date: 06/30/2023    Management per Hepatology. Monitor hepatic tests with routine labs.    Gastroesophageal reflux disease, unspecified whether esophagitis present  -     pantoprazole (PROTONIX) 40 MG tablet; Take 1 tablet (40 mg total) by mouth once daily.  Dispense: 30 tablet; Refill: 11    Controlled. Medication(s) refilled.     Need for shingles vaccine  -     (In Office Administered) Zoster Recombinant Vaccine      RTC in 2 months for last shingles vaccine.  Encouraged COVID booster.    Health maintenance reviewed:  -Pap Smear: [facility] at [facility], [facility] - will obtain  record      Follow-up: Follow up in about 6 months (around 12/30/2023).  ______________________________________________________________________    Chief Complaint  Chief Complaint   Patient presents with    [facility]       HPI  Margie Cifuentes is a 58 y.o. female with medical diagnoses as listed in the medical history and problem list that presents to the office to establish care. She has a history of depression that was previously well controlled with Lexapro. 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 Her sister is also living with her, which has been an added stressor. She is s/p bariatric surgery but admits to regaining some weight back. She is interested in increasing Lexapro. Requesting refill of Protonix.     Health Maintenance         Date Due Completion Date    Cervical Cancer Screening Never done ---    HIV Screening Never done ---    TETANUS VACCINE Never done ---    Shingles Vaccine (1 of 2) Never done ---    COVID-19 Vaccine (3 - Pfizer series) 10/08/2021 8/13/2021    Hemoglobin A1c 08/09/2023 8/9/2022    Colorectal Cancer Screening 12/11/2023 12/11/2020    Lipid Panel 08/09/2027 8/9/2022              PAST MEDICAL HISTORY:  Past Medical History:   Diagnosis Date    B12 deficiency anemia     Intramuscular replacement    Benign hypertension     Breast cancer     Left-sided diagnosed 2003 status post mastectomy chemotherapy and radiation, focal atypical ductal hyperplasia of the right status post mastectomy October 2009    Chronic depression     GERD (gastroesophageal reflux disease)     Hypercalcemia syndrome     Hyperlipidemia LDL goal <130     Hyperparathyroidism due to vitamin D deficiency     Insomnia     Lumbar radiculitis     Lymphedema of arm     Morbid obesity     Vitamin D deficiency disease        PAST SURGICAL HISTORY:  Past Surgical History:   Procedure Laterality Date    BREAST  RECONSTRUCTION      Left and right     x2      Debridement of both breasts      2009    Debridement of leg      2009    EYE MUSCLE SURGERY      Gastric sleeve  10/04/2022    Left mastectomy      2003    Leg grafting      Left leg 2009, donor site right leg    LUMBAR SPINE SURGERY      2008    Right mastectomy      2009    TUBAL LIGATION         SOCIAL HISTORY:  Social History     Socioeconomic History    Marital status:     Number of children: 2    Years of education: [employer]   Occupational History    Occupation: Works for a [employer] company     Employer: [employer]   Tobacco Use    Smoking status: Never    Smokeless tobacco: Never   Substance and Sexual Activity    Alcohol use: No    Drug use: No    Sexual activity: Yes     Partners: Male   Social History Narrative    Adult Screenings updated and reviewed  4/10/14    Mammogram( for females) history of bilateral mastectomy    Pap ( for females)- years      Colonoscopy - done  normal    Flu shot -     Td 2005    Pneumovax updated 4/10/14 due again  2019    Zostavax recommended one time at  age  59    Eye exam  Overdue     Bone density overdue        FAMILY HISTORY:  Family History   Problem Relation Age of Onset    COPD Mother     Heart disease Father     Hypertension Father     Hyperlipidemia Father     COPD Sister     Stroke Brother     Cirrhosis Neg Hx        ALLERGIES AND MEDICATIONS: updated and reviewed.  Review of patient's allergies indicates:   Allergen Reactions    Codeine Nausea Only and Nausea And Vomiting     Current Outpatient Medications   Medication Sig Dispense Refill    amLODIPine (NORVASC) 5 MG tablet Take 1 tablet (5 mg total) by mouth 2 (two) times daily. 60 tablet 11    betamethasone dipropionate 0.05 % cream SMARTSIG:Sparingly Topical Twice Daily PRN      celecoxib (CELEBREX) 200 MG capsule TAKE 1 CAPSULE BY MOUTH ONCE DAILY. 30 capsule 5    cetirizine (ZYRTEC) 10 MG tablet Take 10 mg by mouth once daily.       "ipratropium (ATROVENT) 42 mcg (0.06 %) nasal spray 2 sprays by Nasal route 2 (two) times daily. 15 mL 11    latanoprost 0.005 % ophthalmic solution INSTILL ONE DROP IN EACH EYE EVERY NIGHT AT BEDTIME.      LORazepam (ATIVAN) 1 MG tablet Take 1 tablet (1 mg total) by mouth every evening. 30 tablet 0    LORazepam (ATIVAN) 1 MG tablet Take 1 tablet (1 mg total) by mouth every evening. 30 tablet 0    minoxidiL (LONITEN) 2.5 MG tablet Take 1 tablet (2.5 mg total) by mouth once daily. 90 tablet 3    EScitalopram oxalate (LEXAPRO) 20 MG tablet Take 1 tablet (20 mg total) by mouth once daily. 30 tablet 11    pantoprazole (PROTONIX) 40 MG tablet Take 1 tablet (40 mg total) by mouth once daily. 30 tablet 11     No current facility-administered medications for this visit.         ROS  Review of Systems   Constitutional:  Positive for activity change and unexpected weight change.   Eyes:  Negative for photophobia and visual disturbance.   Respiratory:  Negative for cough and shortness of breath.    Cardiovascular:  Negative for chest pain and leg swelling.   Gastrointestinal:  Negative for abdominal pain, constipation, diarrhea, nausea and vomiting.   Endocrine: Negative for polydipsia, polyphagia and polyuria.   Genitourinary:  Negative for dysuria and urgency.   Skin:  Negative for color change.   Neurological:  Negative for dizziness, weakness and headaches.   Psychiatric/Behavioral:  Positive for dysphoric mood.          Physical Exam  Vitals:    06/30/23 1608   BP: 128/70   BP Location: Right arm   Patient Position: Sitting   BP Method: Medium (Manual)   Pulse: 79   Temp: 98.1 °F (36.7 °C)   TempSrc: Oral   SpO2: 95%   Weight: 115.2 kg (254 lb 1.3 oz)   Height: 5' 5" (1.651 m)    Body mass index is 42.28 kg/m².  Weight: 115.2 kg (254 lb 1.3 oz)   Height: 5' 5" (165.1 cm)   Physical Exam  Constitutional:       General: She is not in acute distress.     Appearance: She is obese.   HENT:      Head: Normocephalic and " atraumatic.   Neck:      Thyroid: No thyromegaly.      Vascular: No carotid bruit.   Cardiovascular:      Rate and Rhythm: Normal rate and regular rhythm.      Pulses: Normal pulses.      Heart sounds: Normal heart sounds.   Pulmonary:      Effort: Pulmonary effort is normal. No respiratory distress.      Breath sounds: Normal breath sounds.   Musculoskeletal:      Cervical back: Neck supple.      Right lower leg: No edema.      Left lower leg: No edema.   Lymphadenopathy:      Cervical: No cervical adenopathy.   Skin:     General: Skin is warm and dry.      Findings: No rash.   Neurological:      General: No focal deficit present.      Mental Status: She is alert and oriented to person, place, and time.   Psychiatric:         Mood and Affect: Mood normal.         Behavior: Behavior normal.         Thought Content: Thought content normal.

## 2023-07-01 ENCOUNTER — PATIENT MESSAGE (OUTPATIENT)
Dept: FAMILY MEDICINE | Facility: CLINIC | Age: 58
End: 2023-07-01
Payer: COMMERCIAL

## 2023-07-01 DIAGNOSIS — K21.9 GASTROESOPHAGEAL REFLUX DISEASE, UNSPECIFIED WHETHER ESOPHAGITIS PRESENT: Primary | ICD-10-CM

## 2023-07-03 ENCOUNTER — PATIENT MESSAGE (OUTPATIENT)
Dept: FAMILY MEDICINE | Facility: CLINIC | Age: 58
End: 2023-07-03
Payer: COMMERCIAL

## 2023-07-03 RX ORDER — ESOMEPRAZOLE MAGNESIUM 40 MG/1
40 CAPSULE, DELAYED RELEASE ORAL
Qty: 30 CAPSULE | Refills: 11 | Status: SHIPPED | OUTPATIENT
Start: 2023-07-03 | End: 2023-11-14

## 2023-07-06 ENCOUNTER — PATIENT MESSAGE (OUTPATIENT)
Dept: FAMILY MEDICINE | Facility: CLINIC | Age: 58
End: 2023-07-06
Payer: COMMERCIAL

## 2023-07-06 ENCOUNTER — PATIENT OUTREACH (OUTPATIENT)
Dept: ADMINISTRATIVE | Facility: HOSPITAL | Age: 58
End: 2023-07-06
Payer: COMMERCIAL

## 2023-07-06 NOTE — LETTER
AUTHORIZATION FOR RELEASE OF   CONFIDENTIAL INFORMATION    Dear [facility] [place],    We are seeing Margie Cifuentes, date of birth 1965, in the clinic at [facility] FAMILY MED/ INTERNAL MED/ PEDS. Diana Sy DO is the patient's PCP. Margie Cifuentes has an outstanding lab/procedure at the time we reviewed her chart. In order to help keep her health information updated, she has authorized us to request the following medical record(s):        (  )  MAMMOGRAM                                      (  )  COLONOSCOPY      ( x ) PAP SMEAR [id number]                       (  )  OUTSIDE LAB RESULTS     (  )  DEXA SCAN                                          (  )  EYE EXAM            (  )  FOOT EXAM                                          (  )  ENTIRE RECORD     (  )  OUTSIDE IMMUNIZATIONS                 (  )  _______________         Please fax records to [facility], Lyndsey R Adams, DO,  [phone].     If you have any questions, please contact Kaylene Dove LPN [facility] at   [phone].       Patient Name: Margie Cifuentes  : 1965  Patient Phone #: [phone]

## 2023-07-11 ENCOUNTER — PATIENT OUTREACH (OUTPATIENT)
Dept: ADMINISTRATIVE | Facility: HOSPITAL | Age: 58
End: 2023-07-11
Payer: COMMERCIAL

## 2023-07-16 ENCOUNTER — PATIENT MESSAGE (OUTPATIENT)
Dept: FAMILY MEDICINE | Facility: CLINIC | Age: 58
End: 2023-07-16
Payer: COMMERCIAL

## 2023-07-16 DIAGNOSIS — G47.01 INSOMNIA DUE TO MEDICAL CONDITION: ICD-10-CM

## 2023-07-16 DIAGNOSIS — M54.16 LUMBAR RADICULITIS: ICD-10-CM

## 2023-07-16 DIAGNOSIS — Z79.899 CHRONIC PRESCRIPTION BENZODIAZEPINE USE: ICD-10-CM

## 2023-07-17 RX ORDER — CELECOXIB 200 MG/1
200 CAPSULE ORAL DAILY
Qty: 30 CAPSULE | Refills: 5 | Status: SHIPPED | OUTPATIENT
Start: 2023-07-17 | End: 2024-01-04 | Stop reason: SDUPTHER

## 2023-07-17 RX ORDER — LORAZEPAM 1 MG/1
1 TABLET ORAL NIGHTLY
Qty: 30 TABLET | Refills: 0 | Status: SHIPPED | OUTPATIENT
Start: 2023-07-17 | End: 2023-08-16 | Stop reason: SDUPTHER

## 2023-07-17 NOTE — TELEPHONE ENCOUNTER
No care due was identified.  [facility] Embedded Care Due Messages. Reference number: [id number].   7/17/2023 7:42:55 AM CDT

## 2023-08-03 ENCOUNTER — LAB VISIT (OUTPATIENT)
Dept: LAB | Facility: HOSPITAL | Age: 58
End: 2023-08-03
Attending: FAMILY MEDICINE
Payer: COMMERCIAL

## 2023-08-03 DIAGNOSIS — K76.0 NAFLD (NONALCOHOLIC FATTY LIVER DISEASE): ICD-10-CM

## 2023-08-03 DIAGNOSIS — K74.00 HEPATIC FIBROSIS: ICD-10-CM

## 2023-08-03 DIAGNOSIS — Z00.00 ANNUAL PHYSICAL EXAM: ICD-10-CM

## 2023-08-03 DIAGNOSIS — I10 ESSENTIAL HYPERTENSION: ICD-10-CM

## 2023-08-03 LAB
ALBUMIN SERPL BCP-MCNC: 3.8 G/DL (ref 3.5–5.2)
ALP SERPL-CCNC: 111 U/L (ref 55–135)
ALT SERPL W/O P-5'-P-CCNC: 25 U/L (ref 10–44)
ANION GAP SERPL CALC-SCNC: 13 MMOL/L (ref 8–16)
AST SERPL-CCNC: 24 U/L (ref 10–40)
BASOPHILS # BLD AUTO: 0.07 K/UL (ref 0–0.2)
BASOPHILS NFR BLD: 0.9 % (ref 0–1.9)
BILIRUB SERPL-MCNC: 0.5 MG/DL (ref 0.1–1)
BUN SERPL-MCNC: 11 MG/DL (ref 6–20)
CALCIUM SERPL-MCNC: 10.3 MG/DL (ref 8.7–10.5)
CHLORIDE SERPL-SCNC: 104 MMOL/L (ref 95–110)
CHOLEST SERPL-MCNC: 207 MG/DL (ref 120–199)
CHOLEST/HDLC SERPL: 3.6 {RATIO} (ref 2–5)
CO2 SERPL-SCNC: 25 MMOL/L (ref 23–29)
CREAT SERPL-MCNC: 0.8 MG/DL (ref 0.5–1.4)
DIFFERENTIAL METHOD: ABNORMAL
EOSINOPHIL # BLD AUTO: 0.2 K/UL (ref 0–0.5)
EOSINOPHIL NFR BLD: 2.7 % (ref 0–8)
ERYTHROCYTE [DISTWIDTH] IN BLOOD BY AUTOMATED COUNT: 12.7 % (ref 11.5–14.5)
EST. GFR  (NO RACE VARIABLE): >60 ML/MIN/1.73 M^2
ESTIMATED AVG GLUCOSE: 94 MG/DL (ref 68–131)
GLUCOSE SERPL-MCNC: 82 MG/DL (ref 70–110)
HBA1C MFR BLD: 4.9 % (ref 4–5.6)
HCT VFR BLD AUTO: 41.4 % (ref 37–48.5)
HDLC SERPL-MCNC: 58 MG/DL (ref 40–75)
HDLC SERPL: 28 % (ref 20–50)
HGB BLD-MCNC: 13.9 G/DL (ref 12–16)
HIV 1+2 AB+HIV1 P24 AG SERPL QL IA: NORMAL
IMM GRANULOCYTES # BLD AUTO: 0.02 K/UL (ref 0–0.04)
IMM GRANULOCYTES NFR BLD AUTO: 0.3 % (ref 0–0.5)
LDLC SERPL CALC-MCNC: 121.8 MG/DL (ref 63–159)
LYMPHOCYTES # BLD AUTO: 2.3 K/UL (ref 1–4.8)
LYMPHOCYTES NFR BLD: 29.8 % (ref 18–48)
MCH RBC QN AUTO: 31.2 PG (ref 27–31)
MCHC RBC AUTO-ENTMCNC: 33.6 G/DL (ref 32–36)
MCV RBC AUTO: 93 FL (ref 82–98)
MONOCYTES # BLD AUTO: 0.5 K/UL (ref 0.3–1)
MONOCYTES NFR BLD: 6.6 % (ref 4–15)
NEUTROPHILS # BLD AUTO: 4.7 K/UL (ref 1.8–7.7)
NEUTROPHILS NFR BLD: 59.7 % (ref 38–73)
NONHDLC SERPL-MCNC: 149 MG/DL
NRBC BLD-RTO: 0 /100 WBC
PLATELET # BLD AUTO: 240 K/UL (ref 150–450)
PMV BLD AUTO: 10.7 FL (ref 9.2–12.9)
POTASSIUM SERPL-SCNC: 4.3 MMOL/L (ref 3.5–5.1)
PROT SERPL-MCNC: 7.7 G/DL (ref 6–8.4)
RBC # BLD AUTO: 4.46 M/UL (ref 4–5.4)
SODIUM SERPL-SCNC: 142 MMOL/L (ref 136–145)
TRIGL SERPL-MCNC: 136 MG/DL (ref 30–150)
WBC # BLD AUTO: 7.86 K/UL (ref 3.9–12.7)

## 2023-08-03 PROCEDURE — 80053 COMPREHEN METABOLIC PANEL: CPT | Performed by: FAMILY MEDICINE

## 2023-08-03 PROCEDURE — 36415 COLL VENOUS BLD VENIPUNCTURE: CPT | Mod: PO | Performed by: FAMILY MEDICINE

## 2023-08-03 PROCEDURE — 85025 COMPLETE CBC W/AUTO DIFF WBC: CPT | Performed by: FAMILY MEDICINE

## 2023-08-03 PROCEDURE — 80061 LIPID PANEL: CPT | Performed by: FAMILY MEDICINE

## 2023-08-03 PROCEDURE — 87389 HIV-1 AG W/HIV-1&-2 AB AG IA: CPT | Performed by: FAMILY MEDICINE

## 2023-08-03 PROCEDURE — 83036 HEMOGLOBIN GLYCOSYLATED A1C: CPT | Performed by: FAMILY MEDICINE

## 2023-08-04 ENCOUNTER — PATIENT MESSAGE (OUTPATIENT)
Dept: FAMILY MEDICINE | Facility: CLINIC | Age: 58
End: 2023-08-04
Payer: COMMERCIAL

## 2023-08-16 DIAGNOSIS — Z79.899 CHRONIC PRESCRIPTION BENZODIAZEPINE USE: ICD-10-CM

## 2023-08-16 DIAGNOSIS — G47.01 INSOMNIA DUE TO MEDICAL CONDITION: ICD-10-CM

## 2023-08-17 RX ORDER — LORAZEPAM 1 MG/1
1 TABLET ORAL NIGHTLY
Qty: 30 TABLET | Refills: 0 | Status: SHIPPED | OUTPATIENT
Start: 2023-08-17 | End: 2023-09-14 | Stop reason: SDUPTHER

## 2023-08-17 NOTE — TELEPHONE ENCOUNTER
No care due was identified.  [facility] Embedded Care Due Messages. Reference number: [id number].   8/16/2023 8:11:13 PM CDT

## 2023-09-01 ENCOUNTER — TELEPHONE (OUTPATIENT)
Dept: FAMILY MEDICINE | Facility: CLINIC | Age: 58
End: 2023-09-01
Payer: COMMERCIAL

## 2023-09-01 DIAGNOSIS — Z23 NEED FOR SHINGLES VACCINE: Primary | ICD-10-CM

## 2023-09-05 ENCOUNTER — CLINICAL SUPPORT (OUTPATIENT)
Dept: FAMILY MEDICINE | Facility: CLINIC | Age: 58
End: 2023-09-05
Payer: COMMERCIAL

## 2023-09-05 VITALS — TEMPERATURE: 98 F

## 2023-09-05 DIAGNOSIS — Z23 NEED FOR SHINGLES VACCINE: Primary | ICD-10-CM

## 2023-09-05 PROCEDURE — 90750 HZV VACC RECOMBINANT IM: CPT | Mod: S$GLB,,, | Performed by: FAMILY MEDICINE

## 2023-09-05 PROCEDURE — 99999 PR PBB SHADOW E&M-EST. PATIENT-LVL I: ICD-10-PCS | Mod: PBBFAC,,,

## 2023-09-05 PROCEDURE — 99999 PR PBB SHADOW E&M-EST. PATIENT-LVL I: CPT | Mod: PBBFAC,,,

## 2023-09-05 PROCEDURE — 90750 ZOSTER RECOMBINANT VACCINE: ICD-10-PCS | Mod: S$GLB,,, | Performed by: FAMILY MEDICINE

## 2023-09-05 PROCEDURE — 90471 IMMUNIZATION ADMIN: CPT | Mod: S$GLB,,, | Performed by: FAMILY MEDICINE

## 2023-09-05 PROCEDURE — 90471 ZOSTER RECOMBINANT VACCINE: ICD-10-PCS | Mod: S$GLB,,, | Performed by: FAMILY MEDICINE

## 2023-09-14 DIAGNOSIS — G47.01 INSOMNIA DUE TO MEDICAL CONDITION: ICD-10-CM

## 2023-09-14 DIAGNOSIS — Z79.899 CHRONIC PRESCRIPTION BENZODIAZEPINE USE: ICD-10-CM

## 2023-09-15 ENCOUNTER — PATIENT MESSAGE (OUTPATIENT)
Dept: FAMILY MEDICINE | Facility: CLINIC | Age: 58
End: 2023-09-15
Payer: COMMERCIAL

## 2023-09-15 RX ORDER — LORAZEPAM 1 MG/1
1 TABLET ORAL NIGHTLY
Qty: 30 TABLET | Refills: 0 | Status: SHIPPED | OUTPATIENT
Start: 2023-09-15 | End: 2023-10-16 | Stop reason: SDUPTHER

## 2023-09-15 NOTE — TELEPHONE ENCOUNTER
No care due was identified.  [facility] Embedded Care Due Messages. Reference number: [id number].   9/14/2023 8:24:13 PM CDT

## 2023-10-12 ENCOUNTER — PATIENT OUTREACH (OUTPATIENT)
Dept: PRIMARY CARE CLINIC | Facility: CLINIC | Age: 58
End: 2023-10-12
Payer: COMMERCIAL

## 2023-10-12 NOTE — PROGRESS NOTES
Health Maintenance Due   Topic Date Due    TETANUS VACCINE  Never done    Cervical Cancer Screening  10/10/2022    Influenza Vaccine (1) 09/01/2023    COVID-19 Vaccine (3 - 2023-24 season) 09/01/2023    Colorectal Cancer Screening  12/11/2023    lvm to contact the office to schedule ,

## 2023-10-12 NOTE — LETTER
October 12, 2023    Margie Cifuentes  [address] [place] [address]             Dear Margie    In addition to helping you feel better when you are sick, we are interested in preventing illness and injury in the first place.  Screening tests and routine follow up tests when you have certain medical problems are very essential in helping you stay as healthy as possible. In the spirit of maintaining your health, our system indicates that you are due for the following:PLEASE CONTACT THE OFFICE TO SCHEDULE THE FOLLOWING    Health Maintenance Due   Topic Date Due    TETANUS VACCINE  Never done    Cervical Cancer Screening  10/10/2022    Influenza Vaccine (1) 09/01/2023    COVID-19 Vaccine (3 - 2023-24 season) 09/01/2023    Colorectal Cancer Screening  12/11/2023        Please be prepared to discuss these recommended tests at your next visit.  If you haven't had a visit within the past one year please call [phone] to schedule or request an appointment.    Sincerely,       Your Health Care Team

## 2023-10-16 ENCOUNTER — PATIENT MESSAGE (OUTPATIENT)
Dept: FAMILY MEDICINE | Facility: CLINIC | Age: 58
End: 2023-10-16
Payer: COMMERCIAL

## 2023-10-16 DIAGNOSIS — Z79.899 CHRONIC PRESCRIPTION BENZODIAZEPINE USE: ICD-10-CM

## 2023-10-16 DIAGNOSIS — G47.01 INSOMNIA DUE TO MEDICAL CONDITION: ICD-10-CM

## 2023-10-16 RX ORDER — LORAZEPAM 1 MG/1
1 TABLET ORAL NIGHTLY
Qty: 30 TABLET | Refills: 0 | Status: SHIPPED | OUTPATIENT
Start: 2023-10-16 | End: 2023-12-14

## 2023-10-16 RX ORDER — LORAZEPAM 1 MG/1
1 TABLET ORAL NIGHTLY
Qty: 30 TABLET | Refills: 0 | Status: SHIPPED | OUTPATIENT
Start: 2023-11-15 | End: 2023-12-14

## 2023-10-16 RX ORDER — LORAZEPAM 1 MG/1
1 TABLET ORAL NIGHTLY
Qty: 30 TABLET | Refills: 0 | Status: SHIPPED | OUTPATIENT
Start: 2023-12-14 | End: 2023-11-14 | Stop reason: SDUPTHER

## 2023-11-13 ENCOUNTER — PATIENT MESSAGE (OUTPATIENT)
Dept: FAMILY MEDICINE | Facility: CLINIC | Age: 58
End: 2023-11-13
Payer: COMMERCIAL

## 2023-11-13 DIAGNOSIS — Z79.899 CHRONIC PRESCRIPTION BENZODIAZEPINE USE: ICD-10-CM

## 2023-11-13 DIAGNOSIS — G47.01 INSOMNIA DUE TO MEDICAL CONDITION: ICD-10-CM

## 2023-11-14 RX ORDER — FLUTICASONE PROPIONATE 50 MCG
1 SPRAY, SUSPENSION (ML) NASAL
COMMUNITY
Start: 2023-11-09

## 2023-11-14 RX ORDER — LORAZEPAM 1 MG/1
1 TABLET ORAL NIGHTLY
Qty: 30 TABLET | Refills: 0 | Status: SHIPPED | OUTPATIENT
Start: 2023-12-14 | End: 2023-12-14

## 2023-11-14 RX ORDER — NETARSUDIL AND LATANOPROST OPHTHALMIC SOLUTION, 0.02%/0.005% .2; .05 MG/ML; MG/ML
1 SOLUTION/ DROPS OPHTHALMIC; TOPICAL NIGHTLY
COMMUNITY
Start: 2023-09-22

## 2023-11-14 NOTE — TELEPHONE ENCOUNTER
No care due was identified.  [facility] Embedded Care Due Messages. Reference number: [id number].   11/14/2023 9:33:46 AM CST

## 2023-12-01 ENCOUNTER — LAB VISIT (OUTPATIENT)
Dept: LAB | Facility: HOSPITAL | Age: 58
End: 2023-12-01
Attending: INTERNAL MEDICINE
Payer: COMMERCIAL

## 2023-12-01 DIAGNOSIS — Z85.3 PERSONAL HISTORY OF MALIGNANT NEOPLASM OF BREAST: ICD-10-CM

## 2023-12-01 LAB
ALBUMIN SERPL BCP-MCNC: 3.7 G/DL (ref 3.5–5.2)
ALP SERPL-CCNC: 119 U/L (ref 55–135)
ALT SERPL W/O P-5'-P-CCNC: 33 U/L (ref 10–44)
ANION GAP SERPL CALC-SCNC: 11 MMOL/L (ref 8–16)
AST SERPL-CCNC: 24 U/L (ref 10–40)
BASOPHILS # BLD AUTO: 0.05 K/UL (ref 0–0.2)
BASOPHILS NFR BLD: 0.7 % (ref 0–1.9)
BILIRUB SERPL-MCNC: 0.5 MG/DL (ref 0.1–1)
BUN SERPL-MCNC: 12 MG/DL (ref 6–20)
CALCIUM SERPL-MCNC: 9.7 MG/DL (ref 8.7–10.5)
CHLORIDE SERPL-SCNC: 103 MMOL/L (ref 95–110)
CO2 SERPL-SCNC: 27 MMOL/L (ref 23–29)
CREAT SERPL-MCNC: 0.9 MG/DL (ref 0.5–1.4)
DIFFERENTIAL METHOD: ABNORMAL
EOSINOPHIL # BLD AUTO: 0.2 K/UL (ref 0–0.5)
EOSINOPHIL NFR BLD: 2.4 % (ref 0–8)
ERYTHROCYTE [DISTWIDTH] IN BLOOD BY AUTOMATED COUNT: 12.8 % (ref 11.5–14.5)
EST. GFR  (NO RACE VARIABLE): >60 ML/MIN/1.73 M^2
GLUCOSE SERPL-MCNC: 87 MG/DL (ref 70–110)
HCT VFR BLD AUTO: 41.1 % (ref 37–48.5)
HGB BLD-MCNC: 13.6 G/DL (ref 12–16)
IMM GRANULOCYTES # BLD AUTO: 0.04 K/UL (ref 0–0.04)
IMM GRANULOCYTES NFR BLD AUTO: 0.6 % (ref 0–0.5)
LYMPHOCYTES # BLD AUTO: 2 K/UL (ref 1–4.8)
LYMPHOCYTES NFR BLD: 27.6 % (ref 18–48)
MCH RBC QN AUTO: 30.2 PG (ref 27–31)
MCHC RBC AUTO-ENTMCNC: 33.1 G/DL (ref 32–36)
MCV RBC AUTO: 91 FL (ref 82–98)
MONOCYTES # BLD AUTO: 0.5 K/UL (ref 0.3–1)
MONOCYTES NFR BLD: 6.7 % (ref 4–15)
NEUTROPHILS # BLD AUTO: 4.5 K/UL (ref 1.8–7.7)
NEUTROPHILS NFR BLD: 62 % (ref 38–73)
NRBC BLD-RTO: 0 /100 WBC
PLATELET # BLD AUTO: 248 K/UL (ref 150–450)
PMV BLD AUTO: 9.4 FL (ref 9.2–12.9)
POTASSIUM SERPL-SCNC: 4.3 MMOL/L (ref 3.5–5.1)
PROT SERPL-MCNC: 7.6 G/DL (ref 6–8.4)
RBC # BLD AUTO: 4.5 M/UL (ref 4–5.4)
SODIUM SERPL-SCNC: 141 MMOL/L (ref 136–145)
WBC # BLD AUTO: 7.2 K/UL (ref 3.9–12.7)

## 2023-12-01 PROCEDURE — 80053 COMPREHEN METABOLIC PANEL: CPT | Performed by: INTERNAL MEDICINE

## 2023-12-01 PROCEDURE — 85025 COMPLETE CBC W/AUTO DIFF WBC: CPT | Performed by: INTERNAL MEDICINE

## 2023-12-01 PROCEDURE — 36415 COLL VENOUS BLD VENIPUNCTURE: CPT | Performed by: INTERNAL MEDICINE

## 2023-12-10 ENCOUNTER — PATIENT MESSAGE (OUTPATIENT)
Dept: FAMILY MEDICINE | Facility: CLINIC | Age: 58
End: 2023-12-10
Payer: COMMERCIAL

## 2023-12-13 ENCOUNTER — LAB VISIT (OUTPATIENT)
Dept: LAB | Facility: HOSPITAL | Age: 58
End: 2023-12-13
Attending: FAMILY MEDICINE
Payer: COMMERCIAL

## 2023-12-13 ENCOUNTER — PATIENT MESSAGE (OUTPATIENT)
Dept: FAMILY MEDICINE | Facility: CLINIC | Age: 58
End: 2023-12-13
Payer: COMMERCIAL

## 2023-12-13 DIAGNOSIS — G47.01 INSOMNIA DUE TO MEDICAL CONDITION: ICD-10-CM

## 2023-12-13 DIAGNOSIS — Z79.899 CHRONIC PRESCRIPTION BENZODIAZEPINE USE: ICD-10-CM

## 2023-12-13 LAB
AMPHET+METHAMPHET UR QL: NEGATIVE
BARBITURATES UR QL SCN>200 NG/ML: NEGATIVE
BENZODIAZ UR QL SCN>200 NG/ML: NEGATIVE
BZE UR QL SCN: NEGATIVE
CANNABINOIDS UR QL SCN: NEGATIVE
CREAT UR-MCNC: 98 MG/DL (ref 15–325)
ETHANOL UR-MCNC: <10 MG/DL
METHADONE UR QL SCN>300 NG/ML: NEGATIVE
OPIATES UR QL SCN: NEGATIVE
PCP UR QL SCN>25 NG/ML: NEGATIVE
TOXICOLOGY INFORMATION: NORMAL

## 2023-12-13 PROCEDURE — 80307 DRUG TEST PRSMV CHEM ANLYZR: CPT | Performed by: FAMILY MEDICINE

## 2023-12-14 DIAGNOSIS — G47.01 INSOMNIA DUE TO MEDICAL CONDITION: ICD-10-CM

## 2023-12-14 DIAGNOSIS — Z79.899 CHRONIC PRESCRIPTION BENZODIAZEPINE USE: ICD-10-CM

## 2023-12-14 RX ORDER — LORAZEPAM 1 MG/1
1 TABLET ORAL NIGHTLY
Qty: 30 TABLET | Refills: 0 | Status: SHIPPED | OUTPATIENT
Start: 2023-12-14 | End: 2024-01-04 | Stop reason: SDUPTHER

## 2023-12-14 NOTE — TELEPHONE ENCOUNTER
No care due was identified.  [facility] Embedded Care Due Messages. Reference number: [id number].   12/14/2023 9:49:50 AM CST

## 2024-01-04 ENCOUNTER — OFFICE VISIT (OUTPATIENT)
Dept: FAMILY MEDICINE | Facility: CLINIC | Age: 59
End: 2024-01-04
Payer: COMMERCIAL

## 2024-01-04 VITALS
OXYGEN SATURATION: 97 % | WEIGHT: 278.88 LBS | DIASTOLIC BLOOD PRESSURE: 74 MMHG | HEIGHT: 65 IN | TEMPERATURE: 98 F | SYSTOLIC BLOOD PRESSURE: 140 MMHG | BODY MASS INDEX: 46.46 KG/M2 | HEART RATE: 81 BPM

## 2024-01-04 DIAGNOSIS — E78.5 HYPERLIPIDEMIA, UNSPECIFIED HYPERLIPIDEMIA TYPE: ICD-10-CM

## 2024-01-04 DIAGNOSIS — K21.9 GASTROESOPHAGEAL REFLUX DISEASE, UNSPECIFIED WHETHER ESOPHAGITIS PRESENT: ICD-10-CM

## 2024-01-04 DIAGNOSIS — J31.0 CHRONIC RHINITIS: ICD-10-CM

## 2024-01-04 DIAGNOSIS — E88.01 LOW PLASMA ALPHA-1 ANTITRYPSIN: ICD-10-CM

## 2024-01-04 DIAGNOSIS — F51.04 PSYCHOPHYSIOLOGICAL INSOMNIA: ICD-10-CM

## 2024-01-04 DIAGNOSIS — Z12.11 COLON CANCER SCREENING: ICD-10-CM

## 2024-01-04 DIAGNOSIS — M51.36 DDD (DEGENERATIVE DISC DISEASE), LUMBAR: ICD-10-CM

## 2024-01-04 DIAGNOSIS — I10 ESSENTIAL HYPERTENSION: ICD-10-CM

## 2024-01-04 DIAGNOSIS — Z56.6 STRESS AT WORK: ICD-10-CM

## 2024-01-04 DIAGNOSIS — K76.0 NAFLD (NONALCOHOLIC FATTY LIVER DISEASE): ICD-10-CM

## 2024-01-04 DIAGNOSIS — F32.0 CURRENT MILD EPISODE OF MAJOR DEPRESSIVE DISORDER WITHOUT PRIOR EPISODE: Primary | ICD-10-CM

## 2024-01-04 DIAGNOSIS — L65.9 ALOPECIA: ICD-10-CM

## 2024-01-04 DIAGNOSIS — E66.01 CLASS 3 SEVERE OBESITY DUE TO EXCESS CALORIES WITH SERIOUS COMORBIDITY AND BODY MASS INDEX (BMI) OF 45.0 TO 49.9 IN ADULT: ICD-10-CM

## 2024-01-04 PROCEDURE — 99999 PR PBB SHADOW E&M-EST. PATIENT-LVL IV: CPT | Mod: PBBFAC,,, | Performed by: FAMILY MEDICINE

## 2024-01-04 PROCEDURE — 3008F BODY MASS INDEX DOCD: CPT | Mod: CPTII,S$GLB,, | Performed by: FAMILY MEDICINE

## 2024-01-04 PROCEDURE — 99214 OFFICE O/P EST MOD 30 MIN: CPT | Mod: S$GLB,,, | Performed by: FAMILY MEDICINE

## 2024-01-04 PROCEDURE — 1159F MED LIST DOCD IN RCRD: CPT | Mod: CPTII,S$GLB,, | Performed by: FAMILY MEDICINE

## 2024-01-04 PROCEDURE — 3077F SYST BP >= 140 MM HG: CPT | Mod: CPTII,S$GLB,, | Performed by: FAMILY MEDICINE

## 2024-01-04 PROCEDURE — 1160F RVW MEDS BY RX/DR IN RCRD: CPT | Mod: CPTII,S$GLB,, | Performed by: FAMILY MEDICINE

## 2024-01-04 PROCEDURE — 3078F DIAST BP <80 MM HG: CPT | Mod: CPTII,S$GLB,, | Performed by: FAMILY MEDICINE

## 2024-01-04 RX ORDER — IPRATROPIUM BROMIDE 42 UG/1
2 SPRAY, METERED NASAL 2 TIMES DAILY
Qty: 15 ML | Refills: 11 | Status: SHIPPED | OUTPATIENT
Start: 2024-01-04

## 2024-01-04 RX ORDER — CELECOXIB 200 MG/1
200 CAPSULE ORAL DAILY
Qty: 90 CAPSULE | Refills: 3 | Status: SHIPPED | OUTPATIENT
Start: 2024-01-04

## 2024-01-04 RX ORDER — AMLODIPINE BESYLATE 5 MG/1
5 TABLET ORAL 2 TIMES DAILY
Qty: 180 TABLET | Refills: 3 | Status: SHIPPED | OUTPATIENT
Start: 2024-01-04

## 2024-01-04 RX ORDER — CETIRIZINE HYDROCHLORIDE 10 MG/1
10 TABLET ORAL DAILY
Qty: 90 TABLET | Refills: 3 | Status: CANCELLED | OUTPATIENT
Start: 2024-01-04 | End: 2025-01-03

## 2024-01-04 RX ORDER — OMEPRAZOLE 40 MG/1
40 CAPSULE, DELAYED RELEASE ORAL DAILY
Qty: 30 CAPSULE | Refills: 11 | Status: SHIPPED | OUTPATIENT
Start: 2024-01-04 | End: 2025-01-03

## 2024-01-04 RX ORDER — MINOXIDIL 2.5 MG/1
2.5 TABLET ORAL DAILY
Qty: 90 TABLET | Refills: 3 | Status: SHIPPED | OUTPATIENT
Start: 2024-01-04

## 2024-01-04 RX ORDER — BUPROPION HYDROCHLORIDE 150 MG/1
150 TABLET ORAL DAILY
Qty: 30 TABLET | Refills: 11 | Status: SHIPPED | OUTPATIENT
Start: 2024-01-04 | End: 2025-01-03

## 2024-01-04 RX ORDER — LORAZEPAM 1 MG/1
1 TABLET ORAL NIGHTLY
Qty: 30 TABLET | Refills: 2 | Status: SHIPPED | OUTPATIENT
Start: 2024-01-04

## 2024-01-04 RX ORDER — LORAZEPAM 1 MG/1
1 TABLET ORAL NIGHTLY
Qty: 30 TABLET | Refills: 0 | Status: SHIPPED | OUTPATIENT
Start: 2024-01-04 | End: 2024-01-04

## 2024-01-04 SDOH — SOCIAL DETERMINANTS OF HEALTH (SDOH): OTHER PHYSICAL AND MENTAL STRAIN RELATED TO WORK: Z56.6

## 2024-01-04 NOTE — Clinical Note
BP mildly elevated today. Patient will keep track of her BP at home on a log which she will send to me in 1 week. Can you call her in case she forgets?

## 2024-01-04 NOTE — PROGRESS NOTES
Assessment & Plan:    Current mild episode of major depressive disorder without prior episode  -     buPROPion (WELLBUTRIN XL) 150 MG TB24 tablet; Take 1 tablet (150 mg total) by mouth once daily.  Dispense: 30 tablet; Refill: 11    Stress at work  -     buPROPion (WELLBUTRIN XL) 150 MG TB24 tablet; Take 1 tablet (150 mg total) by mouth once daily.  Dispense: 30 tablet; Refill: 11    Start methylated folate to take with Lexapro. Start Wellbutrin, with added benefit of weight loss as a side effect. Discussed most common side effects. Allow up to 6 weeks to reach full efficacy.    Psychophysiological insomnia  -     LORazepam (ATIVAN) 1 MG tablet; Take 1 tablet (1 mg total) by mouth every evening.  Dispense: 30 tablet; Refill: 2    Controlled. Medication(s) refilled.     Essential hypertension  -     amLODIPine (NORVASC) 5 MG tablet; Take 1 tablet (5 mg total) by mouth 2 (two) times daily.  Dispense: 180 tablet; Refill: 3  -     CBC Auto Differential; Future; Expected date: 01/04/2024  -     Comprehensive Metabolic Panel; Future; Expected date: 01/04/2024  -     Hemoglobin A1C; Future; Expected date: 01/04/2024  -     Lipid Panel; Future; Expected date: 01/04/2024  -     Urinalysis; Future; Expected date: 01/04/2024    BP elevated, likely due to stress. Amlodipine refilled. Reviewed goal <130/80.   Patient to monitor BP at home on a log to send through the portal in 1 week.    Hyperlipidemia, unspecified hyperlipidemia type  -     Lipid Panel; Future; Expected date: 01/04/2024    Class 3 severe obesity due to excess calories with serious comorbidity and body mass index (BMI) of 45.0 to 49.9 in adult  -     Hemoglobin A1C; Future; Expected date: 01/04/2024    Patient is aware of how her diet and lifestyle affect her CV risk. She will resume her usual healthy diet and exercise to lose weight and prevent CVD.    DDD (degenerative disc disease), lumbar  -     celecoxib (CELEBREX) 200 MG capsule; Take 1 capsule (200 mg  total) by mouth once daily.  Dispense: 90 capsule; Refill: 3    Controlled. Medication(s) refilled.     Chronic rhinitis  -     ipratropium (ATROVENT) 42 mcg (0.06 %) nasal spray; 2 sprays by Each Nostril route 2 (two) times daily.  Dispense: 15 mL; Refill: 11    Controlled. Medication(s) refilled.     NAFLD (nonalcoholic fatty liver disease)  Low plasma alpha-1 antitrypsin  -     Comprehensive Metabolic Panel; Future; Expected date: 01/04/2024    See above.    Alopecia  -     minoxidiL (LONITEN) 2.5 MG tablet; Take 1 tablet (2.5 mg total) by mouth once daily.  Dispense: 90 tablet; Refill: 3    Controlled. Medication(s) refilled.     Gastroesophageal reflux disease, unspecified whether esophagitis present  -     omeprazole (PRILOSEC) 40 MG capsule; Take 1 capsule (40 mg total) by mouth once daily.  Dispense: 30 capsule; Refill: 11    Protonix no longer covered. Start omeprazole.     Colon cancer screening  -     Ambulatory referral/consult to Endo Procedure ; Future; Expected date: 01/05/2024      Health maintenance reviewed & addressed above.  -Encouraged well woman visit    Declines influenza vaccine.     Follow-up: Follow up in about 6 months (around 7/4/2024).  ______________________________________________________________________    Chief Complaint  Chief Complaint   Patient presents with    Health Maintenance       HPI  Margie Cifuentes is a 58 y.o. female with medical diagnoses as listed in the medical history and problem list that presents to the office to follow up on her chronic conditions. Patient has been under a lot of stress at work and admits to stress eating, leading to weight gain. She has occasionally been having to take 1/2 tablet of her lorazepam during the day.     Most recent pertinent workup:     Last CBC Results:   Lab Results   Component Value Date    WBC 7.20 12/01/2023    HGB 13.6 12/01/2023    HCT 41.1 12/01/2023     12/01/2023       Last CMP Results  Lab Results   Component  Value Date     12/01/2023    K 4.3 12/01/2023     12/01/2023    CO2 27 12/01/2023    BUN 12 12/01/2023    CREATININE 0.9 12/01/2023    CALCIUM 9.7 12/01/2023    ALBUMIN 3.7 12/01/2023    AST 24 12/01/2023    ALT 33 12/01/2023       Last Lipids  Lab Results   Component Value Date    CHOL 207 (H) 08/03/2023    TRIG 136 08/03/2023    HDL 58 08/03/2023    LDLCALC 121.8 08/03/2023   The 10-year ASCVD risk score (Temo GONZALEZ, et al., 2019) is: 4.7%    Values used to calculate the score:      Age: 58 years      Sex: Female      Is Non- : No      Diabetic: No      Tobacco smoker: No      Systolic Blood Pressure: 140 mmHg      Is BP treated: Yes      HDL Cholesterol: 58 mg/dL      Total Cholesterol: 207 mg/dL      Last A1C  Lab Results   Component Value Date    HGBA1C 4.9 08/03/2023         Health Maintenance         Date Due Completion Date    Cervical Cancer Screening 10/10/2022 10/10/2019    Influenza Vaccine (1) 09/01/2023 10/25/2022    COVID-19 Vaccine (3 - 2023-24 season) 09/01/2023 8/13/2021    Colorectal Cancer Screening 12/11/2023 12/11/2020    Hemoglobin A1c 08/03/2024 8/3/2023    Lipid Panel 08/03/2028 8/3/2023    TETANUS VACCINE 08/27/2029 8/27/2019              PAST MEDICAL HISTORY:  Past Medical History:   Diagnosis Date    B12 deficiency anemia     Intramuscular replacement    Benign hypertension     Breast cancer     Left-sided diagnosed 2003 status post mastectomy chemotherapy and radiation, focal atypical ductal hyperplasia of the right status post mastectomy October 2009    Chronic depression     GERD (gastroesophageal reflux disease)     Hypercalcemia syndrome     Hyperlipidemia LDL goal <130     Hyperparathyroidism due to vitamin D deficiency     Insomnia     Lumbar radiculitis     Lymphedema of arm     Morbid obesity     Vitamin D deficiency disease        PAST SURGICAL HISTORY:  Past Surgical History:   Procedure Laterality Date    BREAST RECONSTRUCTION      Left and  right     x2      Debridement of both breasts      2009    Debridement of leg      2009    EYE MUSCLE SURGERY      Gastric sleeve  10/04/2022    Left mastectomy      2003    Leg grafting      Left leg 2009, donor site right leg    LUMBAR SPINE SURGERY      2008    Right mastectomy      2009    TUBAL LIGATION         SOCIAL HISTORY:  Social History     Socioeconomic History    Marital status:     Number of children: 2    Years of education: votech   Occupational History    Occupation: Works for a [employer] company     Employer: [employer]   Tobacco Use    Smoking status: Never    Smokeless tobacco: Never   Substance and Sexual Activity    Alcohol use: No    Drug use: No    Sexual activity: Yes     Partners: Male   Social History Narrative    Adult Screenings updated and reviewed  4/10/14    Mammogram( for females) history of bilateral mastectomy    Pap ( for females)- years      Colonoscopy - done  normal    Flu shot -     Td 2005    Pneumovax updated 4/10/14 due again  2019    Zostavax recommended one time at  age  59    Eye exam  Overdue     Bone density overdue        FAMILY HISTORY:  Family History   Problem Relation Age of Onset    COPD Mother     Heart disease Father     Hypertension Father     Hyperlipidemia Father     COPD Sister     Stroke Brother     Cirrhosis Neg Hx        ALLERGIES AND MEDICATIONS: updated and reviewed.  Review of patient's allergies indicates:   Allergen Reactions    Codeine Nausea Only and Nausea And Vomiting     Current Outpatient Medications   Medication Sig Dispense Refill    cetirizine (ZYRTEC) 10 MG tablet Take 10 mg by mouth once daily.      EScitalopram oxalate (LEXAPRO) 20 MG tablet Take 1 tablet (20 mg total) by mouth once daily. 30 tablet 11    ROCKLATAN 0.02-0.005 % Drop Place 1 drop into both eyes every evening.      amLODIPine (NORVASC) 5 MG tablet Take 1 tablet (5 mg total) by mouth 2 (two) times daily. 180 tablet 3    buPROPion (WELLBUTRIN XL)  "150 MG TB24 tablet Take 1 tablet (150 mg total) by mouth once daily. 30 tablet 11    celecoxib (CELEBREX) 200 MG capsule Take 1 capsule (200 mg total) by mouth once daily. 90 capsule 3    fluticasone propionate (FLONASE) 50 mcg/actuation nasal spray 1 spray by Each Nostril route.      ipratropium (ATROVENT) 42 mcg (0.06 %) nasal spray 2 sprays by Each Nostril route 2 (two) times daily. 15 mL 11    LORazepam (ATIVAN) 1 MG tablet Take 1 tablet (1 mg total) by mouth every evening. 30 tablet 2    minoxidiL (LONITEN) 2.5 MG tablet Take 1 tablet (2.5 mg total) by mouth once daily. 90 tablet 3    omeprazole (PRILOSEC) 40 MG capsule Take 1 capsule (40 mg total) by mouth once daily. 30 capsule 11     No current facility-administered medications for this visit.         ROS  Review of Systems   Constitutional:  Positive for appetite change and unexpected weight change. Negative for activity change.   Psychiatric/Behavioral:  Positive for sleep disturbance. The patient is nervous/anxious.            Physical Exam  Vitals:    01/04/24 1552   BP: (!) 140/74   BP Location: Right arm   Patient Position: Sitting   BP Method: Large (Manual)   Pulse: 81   Temp: 97.8 °F (36.6 °C)   TempSrc: Oral   SpO2: 97%   Weight: 126.5 kg (278 lb 14.1 oz)   Height: 5' 5" (1.651 m)    Body mass index is 46.41 kg/m².  Weight: 126.5 kg (278 lb 14.1 oz)   Height: 5' 5" (165.1 cm)   Physical Exam  Constitutional:       General: She is not in acute distress.     Appearance: She is obese.   HENT:      Head: Normocephalic and atraumatic.   Neck:      Thyroid: No thyromegaly.      Vascular: No carotid bruit.   Cardiovascular:      Rate and Rhythm: Normal rate and regular rhythm.      Pulses: Normal pulses.      Heart sounds: Normal heart sounds.   Pulmonary:      Effort: Pulmonary effort is normal. No respiratory distress.      Breath sounds: Normal breath sounds.   Musculoskeletal:      Cervical back: Neck supple.      Right lower leg: No edema.      Left " lower leg: No edema.   Lymphadenopathy:      Cervical: No cervical adenopathy.   Skin:     General: Skin is warm and dry.      Findings: No rash.   Neurological:      General: No focal deficit present.      Mental Status: She is alert and oriented to person, place, and time.   Psychiatric:         Mood and Affect: Mood normal.         Behavior: Behavior normal.         Thought Content: Thought content normal.

## 2024-01-10 ENCOUNTER — PATIENT OUTREACH (OUTPATIENT)
Dept: ADMINISTRATIVE | Facility: HOSPITAL | Age: 59
End: 2024-01-10
Payer: COMMERCIAL

## 2024-01-10 ENCOUNTER — PATIENT MESSAGE (OUTPATIENT)
Dept: ADMINISTRATIVE | Facility: HOSPITAL | Age: 59
End: 2024-01-10
Payer: COMMERCIAL

## 2024-01-10 NOTE — PROGRESS NOTES
Non-Compliant Blood Pressure Reading - the patient will call back with a reading.    Overdue Pap Smear - the patient declined to schedule at this time, she will call back when she is ready.

## 2024-01-25 ENCOUNTER — TELEPHONE (OUTPATIENT)
Dept: ENDOSCOPY | Facility: HOSPITAL | Age: 59
End: 2024-01-25

## 2024-01-25 ENCOUNTER — CLINICAL SUPPORT (OUTPATIENT)
Dept: ENDOSCOPY | Facility: HOSPITAL | Age: 59
End: 2024-01-25
Attending: FAMILY MEDICINE
Payer: COMMERCIAL

## 2024-01-25 DIAGNOSIS — Z12.11 COLON CANCER SCREENING: ICD-10-CM

## 2024-01-25 NOTE — PLAN OF CARE
Contacted patient to schedule colonoscopy. No slots available at this time. New PAT appt scheduled. Patient verbalized understanding.

## 2024-02-28 ENCOUNTER — CLINICAL SUPPORT (OUTPATIENT)
Dept: ENDOSCOPY | Facility: HOSPITAL | Age: 59
End: 2024-02-28
Attending: FAMILY MEDICINE
Payer: COMMERCIAL

## 2024-02-28 ENCOUNTER — TELEPHONE (OUTPATIENT)
Dept: ENDOSCOPY | Facility: HOSPITAL | Age: 59
End: 2024-02-28

## 2024-02-28 VITALS — HEIGHT: 65 IN | WEIGHT: 279 LBS | BODY MASS INDEX: 46.48 KG/M2

## 2024-02-28 DIAGNOSIS — Z12.11 COLON CANCER SCREENING: ICD-10-CM

## 2024-02-28 RX ORDER — POLYETHYLENE GLYCOL 3350, SODIUM SULFATE ANHYDROUS, SODIUM BICARBONATE, SODIUM CHLORIDE, POTASSIUM CHLORIDE 236; 22.74; 6.74; 5.86; 2.97 G/4L; G/4L; G/4L; G/4L; G/4L
4 POWDER, FOR SOLUTION ORAL ONCE
Qty: 4000 ML | Refills: 0 | Status: SHIPPED | OUTPATIENT
Start: 2024-02-28 | End: 2024-02-28

## 2024-02-28 NOTE — TELEPHONE ENCOUNTER
Spoke to patient to schedule procedure(s) Colonoscopy       Physician to perform procedure(s) Dr. MIROSLAVA Pérez   Date of Procedure (s) 6/5/24  Arrival Time 12:00 PM  Time of Procedure(s) 1:00 PM   [place] [facility]  Enter at the rear of the building through the emergency department screening station or the Outpatient Registration door, then continue to endoscopy department on the 2nd floor.    Type of Rx Prep sent to patient: PEG  Instructions provided to patient via [facility]    Patient was informed on the following information and verbalized understanding. Screening questionnaire reviewed with patient and complete. If procedure requires anesthesia, a responsible adult needs to be present to accompany the patient home, patient cannot drive after receiving anesthesia. Appointment details are tentative, especially check-in time. Patient will receive a prep-op call 7 days prior to confirm check-in time for procedure. If applicable the patient should contact their pharmacy to verify Rx for procedure prep is ready for pick-up. Patient was advised to call the scheduling department at [phone] if pharmacy states no Rx is available. Patient was advised to call the endoscopy scheduling department if any questions or concerns arise.      [facility] Scheduling Department

## 2024-03-04 ENCOUNTER — PATIENT MESSAGE (OUTPATIENT)
Dept: ADMINISTRATIVE | Facility: HOSPITAL | Age: 59
End: 2024-03-04
Payer: COMMERCIAL

## 2024-04-29 ENCOUNTER — TELEPHONE (OUTPATIENT)
Dept: HEPATOLOGY | Facility: CLINIC | Age: 59
End: 2024-04-29
Payer: COMMERCIAL

## 2024-04-29 NOTE — TELEPHONE ENCOUNTER
----- Message from Debbie Aleman sent at 4/29/2024  9:34 AM CDT -----  Regarding: Reschedule Existing Appointment  Contact: Margie Dickens Existing Appointment     Current appt date:05/10/2024     Type of appt :fibro scan/ annual fu     Physician:Naima Pat     Reason for rescheduling:Schedule conflict     Caller:Margie Cifuentes       Contact Preference:[phone] (home) , can also be put in to portal.         Note: pt would like to reschedule for any other date with the exception of 05/10. Or any Mon or Friday, (those are not good days.

## 2024-04-29 NOTE — TELEPHONE ENCOUNTER
Returned call to the patient.  She stated that she can't come for 5/10 appt and wants to reschedule to the next available.  Scheduled to the next available 8/28/2024 follow up and fibroscan.  Patient confirmed and agreed with the changes.  Reminder letter mailed.

## 2024-05-03 ENCOUNTER — HOSPITAL ENCOUNTER (OUTPATIENT)
Dept: RADIOLOGY | Facility: HOSPITAL | Age: 59
Discharge: HOME | End: 2024-05-03
Attending: NURSE PRACTITIONER
Payer: COMMERCIAL

## 2024-05-03 DIAGNOSIS — K76.0 NAFLD (NONALCOHOLIC FATTY LIVER DISEASE): ICD-10-CM

## 2024-05-03 DIAGNOSIS — K74.01 HEPATIC FIBROSIS, EARLY FIBROSIS: ICD-10-CM

## 2024-05-03 PROCEDURE — 76705 ECHO EXAM OF ABDOMEN: CPT | Mod: TC

## 2024-05-03 PROCEDURE — 76705 ECHO EXAM OF ABDOMEN: CPT | Mod: 26,,, | Performed by: RADIOLOGY

## 2024-05-14 DIAGNOSIS — F51.04 PSYCHOPHYSIOLOGICAL INSOMNIA: ICD-10-CM

## 2024-05-14 RX ORDER — LORAZEPAM 1 MG/1
1 TABLET ORAL NIGHTLY
Qty: 30 TABLET | Refills: 2 | Status: SHIPPED | OUTPATIENT
Start: 2024-05-14

## 2024-05-29 ENCOUNTER — TELEPHONE (OUTPATIENT)
Dept: ENDOSCOPY | Facility: HOSPITAL | Age: 59
End: 2024-05-29
Payer: COMMERCIAL

## 2024-05-29 DIAGNOSIS — Z12.11 SCREEN FOR COLON CANCER: Primary | ICD-10-CM

## 2024-06-04 ENCOUNTER — ANESTHESIA EVENT (OUTPATIENT)
Dept: ENDOSCOPY | Facility: HOSPITAL | Age: 59
End: 2024-06-04
Payer: COMMERCIAL

## 2024-06-05 ENCOUNTER — ANESTHESIA (OUTPATIENT)
Dept: ENDOSCOPY | Facility: HOSPITAL | Age: 59
End: 2024-06-05
Payer: COMMERCIAL

## 2024-06-05 ENCOUNTER — HOSPITAL ENCOUNTER (OUTPATIENT)
Facility: HOSPITAL | Age: 59
Discharge: HOME | End: 2024-06-05
Attending: INTERNAL MEDICINE | Admitting: INTERNAL MEDICINE
Payer: COMMERCIAL

## 2024-06-05 VITALS
TEMPERATURE: 98 F | RESPIRATION RATE: 20 BRPM | OXYGEN SATURATION: 98 % | DIASTOLIC BLOOD PRESSURE: 9 MMHG | SYSTOLIC BLOOD PRESSURE: 132 MMHG | HEART RATE: 69 BPM

## 2024-06-05 DIAGNOSIS — Z86.010 HISTORY OF COLON POLYPS: Primary | ICD-10-CM

## 2024-06-05 PROCEDURE — 45380 COLONOSCOPY AND BIOPSY: CPT | Mod: PT,59 | Performed by: INTERNAL MEDICINE

## 2024-06-05 PROCEDURE — 45380 COLONOSCOPY AND BIOPSY: CPT | Mod: 33,59,, | Performed by: INTERNAL MEDICINE

## 2024-06-05 PROCEDURE — 37000008 HC ANESTHESIA 1ST 15 MINUTES: Performed by: INTERNAL MEDICINE

## 2024-06-05 PROCEDURE — 45385 COLONOSCOPY W/LESION REMOVAL: CPT | Mod: 33,,, | Performed by: INTERNAL MEDICINE

## 2024-06-05 PROCEDURE — 25000003 PHARM REV CODE 250: Performed by: NURSE ANESTHETIST, CERTIFIED REGISTERED

## 2024-06-05 PROCEDURE — 27201089 HC SNARE, DISP (ANY): Performed by: INTERNAL MEDICINE

## 2024-06-05 PROCEDURE — 37000009 HC ANESTHESIA EA ADD 15 MINS: Performed by: INTERNAL MEDICINE

## 2024-06-05 PROCEDURE — D9220A PRA ANESTHESIA: Mod: 33,CRNA,, | Performed by: NURSE ANESTHETIST, CERTIFIED REGISTERED

## 2024-06-05 PROCEDURE — D9220A PRA ANESTHESIA: Mod: 33,ANES,, | Performed by: ANESTHESIOLOGY

## 2024-06-05 PROCEDURE — 88305 TISSUE EXAM BY PATHOLOGIST: CPT | Mod: 26,,, | Performed by: PATHOLOGY

## 2024-06-05 PROCEDURE — 63600175 PHARM REV CODE 636 W HCPCS: Performed by: NURSE ANESTHETIST, CERTIFIED REGISTERED

## 2024-06-05 PROCEDURE — 88305 TISSUE EXAM BY PATHOLOGIST: CPT | Performed by: PATHOLOGY

## 2024-06-05 PROCEDURE — 45385 COLONOSCOPY W/LESION REMOVAL: CPT | Mod: PT | Performed by: INTERNAL MEDICINE

## 2024-06-05 PROCEDURE — 25000003 PHARM REV CODE 250: Performed by: ANESTHESIOLOGY

## 2024-06-05 PROCEDURE — 27201012 HC FORCEPS, HOT/COLD, DISP: Performed by: INTERNAL MEDICINE

## 2024-06-05 RX ORDER — LIDOCAINE HYDROCHLORIDE 20 MG/ML
INJECTION INTRAVENOUS
Status: DISCONTINUED | OUTPATIENT
Start: 2024-06-05 | End: 2024-06-05

## 2024-06-05 RX ORDER — PROPOFOL 10 MG/ML
VIAL (ML) INTRAVENOUS
Status: DISCONTINUED | OUTPATIENT
Start: 2024-06-05 | End: 2024-06-05

## 2024-06-05 RX ORDER — LIDOCAINE HYDROCHLORIDE 10 MG/ML
1 INJECTION, SOLUTION EPIDURAL; INFILTRATION; INTRACAUDAL; PERINEURAL ONCE
Status: DISCONTINUED | OUTPATIENT
Start: 2024-06-05 | End: 2024-06-05 | Stop reason: HOSPADM

## 2024-06-05 RX ORDER — PROPOFOL 10 MG/ML
VIAL (ML) INTRAVENOUS
Status: DISCONTINUED
Start: 2024-06-05 | End: 2024-06-05 | Stop reason: HOSPADM

## 2024-06-05 RX ORDER — SODIUM CHLORIDE 9 MG/ML
INJECTION, SOLUTION INTRAVENOUS CONTINUOUS
Status: DISCONTINUED | OUTPATIENT
Start: 2024-06-05 | End: 2024-06-05 | Stop reason: HOSPADM

## 2024-06-05 RX ORDER — LIDOCAINE HYDROCHLORIDE 10 MG/ML
INJECTION, SOLUTION EPIDURAL; INFILTRATION; INTRACAUDAL; PERINEURAL
Status: DISCONTINUED
Start: 2024-06-05 | End: 2024-06-05 | Stop reason: HOSPADM

## 2024-06-05 RX ADMIN — SODIUM CHLORIDE: 9 INJECTION, SOLUTION INTRAVENOUS at 12:06

## 2024-06-05 RX ADMIN — LIDOCAINE HYDROCHLORIDE 100 MG: 20 INJECTION, SOLUTION INTRAVENOUS at 01:06

## 2024-06-05 RX ADMIN — PROPOFOL 40 MG: 10 INJECTION, EMULSION INTRAVENOUS at 01:06

## 2024-06-05 RX ADMIN — PROPOFOL 50 MG: 10 INJECTION, EMULSION INTRAVENOUS at 01:06

## 2024-06-05 RX ADMIN — PROPOFOL 20 MG: 10 INJECTION, EMULSION INTRAVENOUS at 01:06

## 2024-06-05 RX ADMIN — PROPOFOL 30 MG: 10 INJECTION, EMULSION INTRAVENOUS at 01:06

## 2024-06-05 RX ADMIN — PROPOFOL 100 MG: 10 INJECTION, EMULSION INTRAVENOUS at 01:06

## 2024-06-05 NOTE — PROVATION PATIENT INSTRUCTIONS
Discharge Summary/Instructions after an Endoscopic Procedure  Patient Name: Margie Cifuentes  Patient MRN: [id number]  Patient YOB: 1965 Wednesday, June 5, 2024  Danielle Pérez MD  Dear patient,  As a result of recent federal legislation (The Federal Cures Act), you may   receive lab or pathology results from your procedure in your [facility]   account before your physician is able to contact you. Your physician or   their representative will relay the results to you with their   recommendations at their soonest availability.  Thank you,  RESTRICTIONS:  During your procedure today, you received medications for sedation.  These   medications may affect your judgment, balance and coordination.  Therefore,   for 24 hours, you have the following restrictions:   - DO NOT drive a car, operate machinery, make legal/financial decisions,   sign important papers or drink alcohol.    ACTIVITY:  Today: no heavy lifting, straining or running due to procedural   sedation/anesthesia.  The following day: return to full activity including work.  DIET:  Eat and drink normally unless instructed otherwise.     TREATMENT FOR COMMON SIDE EFFECTS:  - Mild abdominal pain, nausea, belching, bloating or excessive gas:  rest,   eat lightly and use a heating pad.  - Sore Throat: treat with throat lozenges and/or gargle with warm salt   water.  - Because air was used during the procedure, expelling large amounts of air   from your rectum or belching is normal.  - If a bowel prep was taken, you may not have a bowel movement for 1-3 days.    This is normal.  SYMPTOMS TO WATCH FOR AND REPORT TO YOUR PHYSICIAN:  1. Abdominal pain or bloating, other than gas cramps.  2. Chest pain.  3. Back pain.  4. Signs of infection such as: chills or fever occurring within 24 hours   after the procedure.  5. Rectal bleeding, which would show as bright red, maroon, or black stools.   (A tablespoon of blood from the rectum is not serious, especially  if   hemorrhoids are present.)  6. Vomiting.  7. Weakness or dizziness.  GO DIRECTLY TO THE NEAREST EMERGENCY ROOM IF YOU HAVE ANY OF THE FOLLOWING:      Difficulty breathing              Chills and/or fever over 101 F   Persistent vomiting and/or vomiting blood   Severe abdominal pain   Severe chest pain   Black, tarry stools   Bleeding- more than one tablespoon   Any other symptom or condition that you feel may need urgent attention  Your doctor recommends these additional instructions:  If any biopsies were taken, your doctors clinic will contact you in 1 to 2   weeks with any results.  - Discharge patient to home.   - Resume previous diet.   - Continue present medications.   - Await pathology results.   - Repeat colonoscopy in 3 years for surveillance.  For questions, problems or results please call your physician - Danielle Pérez MD at Work:  [phone].  [facility] can be reached at [phone]     IF A COMPLICATION OR EMERGENCY SITUATION ARISES AND YOU ARE UNABLE TO REACH   YOUR PHYSICIAN - GO DIRECTLY TO THE EMERGENCY ROOM.  Danielle Pérez MD  6/5/2024 1:42:22 PM  This report has been verified and signed electronically.  Dear patient,  As a result of recent federal legislation (The Federal Cures Act), you may   receive lab or pathology results from your procedure in your [facility]   account before your physician is able to contact you. Your physician or   their representative will relay the results to you with their   recommendations at their soonest availability.  Thank you,  PROVATION

## 2024-06-05 NOTE — H&P
Short Stay Endoscopy History and Physical    PCP - Diana Sy, DO    Procedure - Colonoscopy  ASA - per anesthesia  Mallampati - per anesthesia  History of Anesthesia problems - no  Family history Anesthesia problems - no   Plan of anesthesia - General    HPI:  This is a 59 y.o. female here for evaluation of : personal history of colon polyps      ROS:  Constitutional: No fevers, chills, No weight loss  CV: No chest pain  Pulm: No cough, No shortness of breath  GI: see HPI  Derm: No rash    Medical History:  has a past medical history of B12 deficiency anemia, Benign hypertension, Breast cancer, Chronic depression, GERD (gastroesophageal reflux disease), Hypercalcemia syndrome, Hyperlipidemia LDL goal <130, Hyperparathyroidism due to vitamin D deficiency, Insomnia, Lumbar radiculitis, Lymphedema of arm, Morbid obesity, and Vitamin D deficiency disease.    Surgical History:  has a past surgical history that includes Lumbar spine surgery; Left mastectomy; Right mastectomy;  x2; Tubal ligation; Eye muscle surgery; Breast reconstruction; Leg grafting; Debridement of both breasts; Debridement of leg; and Gastric sleeve (10/04/2022).    Family History: family history includes COPD in her mother and sister; Heart disease in her father; Hyperlipidemia in her father; Hypertension in her father; Stroke in her brother.. Otherwise no colon cancer, inflammatory bowel disease, or GI malignancies.    Social History:  reports that she has never smoked. She has never used smokeless tobacco. She reports that she does not drink alcohol and does not use drugs.    Review of patient's allergies indicates:   Allergen Reactions    Codeine Nausea Only and Nausea And Vomiting       Medications:   Medications Prior to Admission   Medication Sig Dispense Refill Last Dose    amLODIPine (NORVASC) 5 MG tablet Take 1 tablet (5 mg total) by mouth 2 (two) times daily. 180 tablet 3     buPROPion (WELLBUTRIN XL) 150 MG TB24 tablet  Take 1 tablet (150 mg total) by mouth once daily. 30 tablet 11     celecoxib (CELEBREX) 200 MG capsule Take 1 capsule (200 mg total) by mouth once daily. 90 capsule 3     cetirizine (ZYRTEC) 10 MG tablet Take 10 mg by mouth once daily.       EScitalopram oxalate (LEXAPRO) 20 MG tablet Take 1 tablet (20 mg total) by mouth once daily. 30 tablet 11     fluticasone propionate (FLONASE) 50 mcg/actuation nasal spray 1 spray by Each Nostril route.       ipratropium (ATROVENT) 42 mcg (0.06 %) nasal spray 2 sprays by Each Nostril route 2 (two) times daily. 15 mL 11     LORazepam (ATIVAN) 1 MG tablet Take 1 tablet (1 mg total) by mouth every evening. 30 tablet 2     minoxidiL (LONITEN) 2.5 MG tablet Take 1 tablet (2.5 mg total) by mouth once daily. 90 tablet 3     omeprazole (PRILOSEC) 40 MG capsule Take 1 capsule (40 mg total) by mouth once daily. 30 capsule 11     ROCKLATAN 0.02-0.005 % Drop Place 1 drop into both eyes every evening.            Physical Exam:    Vital Signs: There were no vitals filed for this visit.    Gen: NAD, lying comfortably  HENT: NCAT, oropharynx clear  Eyes: anicteric sclerae, EOMI grossly  Neck: supple, no visible masses/goiter  Cardiac: RRR  Lungs: non-labored breathing  Abd: soft, NT/ND, normoactive BS  Ext: no LE edema, warm, well perfused  Skin: skin intact on exposed body parts, no visible rashes, lesions  Neuro: A&Ox4, neuro exam grossly intact, moves all extremities  Psych: appropriate mood, affect        Labs:  Lab Results   Component Value Date    WBC 7.20 12/01/2023    HGB 13.6 12/01/2023    HCT 41.1 12/01/2023     12/01/2023    CHOL 207 (H) 08/03/2023    TRIG 136 08/03/2023    HDL 58 08/03/2023    ALT 33 12/01/2023    AST 24 12/01/2023     12/01/2023    K 4.3 12/01/2023     12/01/2023    CREATININE 0.9 12/01/2023    BUN 12 12/01/2023    CO2 27 12/01/2023    TSH 1.308 08/09/2022    HGBA1C 4.9 08/03/2023       Plan:  Colonoscopy for a history of colon polyps.    I have  explained the risks and benefits of endoscopy procedures to the patient including but not limited to bleeding, perforation, infection, and death.  The patient was asked if they understand and allowed to ask any further questions to their satisfaction.    Danielle Pérez MD

## 2024-06-05 NOTE — ANESTHESIA PREPROCEDURE EVALUATION
06/05/2024  Margie Cifuentes is a 59 y.o., female.      Pre-op Assessment    I have reviewed the Patient Summary Reports.     I have reviewed the Nursing Notes.    I have reviewed the Medications.     Review of Systems  Anesthesia Hx:             Denies Family Hx of Anesthesia complications.    Denies Personal Hx of Anesthesia complications.                    Procedure: COLONOSCOPY (N/A)         Patient Active Problem List   Diagnosis    Lumbar radiculitis    Insomnia    Chronic depression    Vitamin D deficiency disease    Hyperparathyroidism due to vitamin D deficiency    Hyperlipidemia LDL goal <130    Benign hypertension    Lymphedema of arm    NAFLD (nonalcoholic fatty liver disease)    Papilledema associated with retinal disorder    Obstructive sleep apnea syndrome    Hypertensive heart disease    Hyperuricemia without signs of inflammatory arthritis and tophaceous disease    Ischemic optic neuropathy    Hepatic fibrosis, early fibrosis    Low plasma alpha-1 antitrypsin       Past Medical History:   Diagnosis Date    B12 deficiency anemia     Intramuscular replacement    Benign hypertension     Breast cancer     Left-sided diagnosed 2003 status post mastectomy chemotherapy and radiation, focal atypical ductal hyperplasia of the right status post mastectomy October 2009    Chronic depression     GERD (gastroesophageal reflux disease)     Hypercalcemia syndrome     Hyperlipidemia LDL goal <130     Hyperparathyroidism due to vitamin D deficiency     Insomnia     Lumbar radiculitis     Lymphedema of arm     Morbid obesity     Vitamin D deficiency disease        ECHO: No results found for this or any previous visit.      There is no height or weight on file to calculate BMI.    Tobacco Use: Low Risk  (5/26/2024)    Received from [facility]    Patient History     Smoking Tobacco Use: Never     Smokeless  Tobacco Use: Never     Passive Exposure: Not on file       Social History     Substance and Sexual Activity   Drug Use No        Alcohol Use: Not on file       Review of patient's allergies indicates:   Allergen Reactions    Codeine Nausea Only and Nausea And Vomiting         Airway:  No value filed.     Physical Exam  General: Alert and Oriented    Airway:  Mouth Opening: Normal  TM Distance: Normal  Neck ROM: Normal ROM        Anesthesia Plan  Type of Anesthesia, risks & benefits discussed:    Anesthesia Type: Gen Natural Airway  Intra-op Monitoring Plan: Standard ASA Monitors  Post Op Pain Control Plan: multimodal analgesia  Induction:  IV  Informed Consent: Informed consent signed with the Patient and all parties understand the risks and agree with anesthesia plan.  All questions answered.   ASA Score: 3  Day of Surgery Review of History & Physical: H&P Update referred to the surgeon/provider.    Ready For Surgery From Anesthesia Perspective.     .

## 2024-06-05 NOTE — TRANSFER OF CARE
Anesthesia Transfer of Care Note    Patient: Margie Cifuentes    Procedure(s) Performed: Procedure(s) (LRB):  COLONOSCOPY (N/A)    Patient location: GI    Anesthesia Type: general    Transport from OR: Transported from OR on room air with adequate spontaneous ventilation    Post pain: adequate analgesia    Post assessment: no apparent anesthetic complications and tolerated procedure well    Post vital signs: stable    Level of consciousness: awake    Nausea/Vomiting: no nausea/vomiting    Complications: none    Transfer of care protocol was followed      Last vitals: Visit Vitals  BP (!) 113/59   Pulse 80   Temp 36.6 °C (97.8 °F) (Oral)   Resp 18   SpO2 100%   Breastfeeding No

## 2024-06-07 LAB
FINAL PATHOLOGIC DIAGNOSIS: NORMAL
GROSS: NORMAL
Lab: NORMAL

## 2024-06-27 ENCOUNTER — LAB VISIT (OUTPATIENT)
Dept: LAB | Facility: HOSPITAL | Age: 59
End: 2024-06-27
Attending: FAMILY MEDICINE
Payer: COMMERCIAL

## 2024-06-27 DIAGNOSIS — E66.01 CLASS 3 SEVERE OBESITY DUE TO EXCESS CALORIES WITH SERIOUS COMORBIDITY AND BODY MASS INDEX (BMI) OF 45.0 TO 49.9 IN ADULT: ICD-10-CM

## 2024-06-27 DIAGNOSIS — E78.5 HYPERLIPIDEMIA, UNSPECIFIED HYPERLIPIDEMIA TYPE: ICD-10-CM

## 2024-06-27 DIAGNOSIS — I10 ESSENTIAL HYPERTENSION: ICD-10-CM

## 2024-06-27 LAB
ALBUMIN SERPL BCP-MCNC: 3.5 G/DL (ref 3.5–5.2)
ALP SERPL-CCNC: 129 U/L (ref 55–135)
ALT SERPL W/O P-5'-P-CCNC: 30 U/L (ref 10–44)
ANION GAP SERPL CALC-SCNC: 8 MMOL/L (ref 8–16)
AST SERPL-CCNC: 23 U/L (ref 10–40)
BASOPHILS # BLD AUTO: 0.09 K/UL (ref 0–0.2)
BASOPHILS NFR BLD: 1 % (ref 0–1.9)
BILIRUB SERPL-MCNC: 0.5 MG/DL (ref 0.1–1)
BUN SERPL-MCNC: 12 MG/DL (ref 6–20)
CALCIUM SERPL-MCNC: 9.8 MG/DL (ref 8.7–10.5)
CHLORIDE SERPL-SCNC: 105 MMOL/L (ref 95–110)
CHOLEST SERPL-MCNC: 198 MG/DL (ref 120–199)
CHOLEST/HDLC SERPL: 4.2 {RATIO} (ref 2–5)
CO2 SERPL-SCNC: 25 MMOL/L (ref 23–29)
CREAT SERPL-MCNC: 1 MG/DL (ref 0.5–1.4)
DIFFERENTIAL METHOD BLD: ABNORMAL
EOSINOPHIL # BLD AUTO: 0.2 K/UL (ref 0–0.5)
EOSINOPHIL NFR BLD: 1.7 % (ref 0–8)
ERYTHROCYTE [DISTWIDTH] IN BLOOD BY AUTOMATED COUNT: 12.8 % (ref 11.5–14.5)
EST. GFR  (NO RACE VARIABLE): >60 ML/MIN/1.73 M^2
ESTIMATED AVG GLUCOSE: 103 MG/DL (ref 68–131)
GLUCOSE SERPL-MCNC: 88 MG/DL (ref 70–110)
HBA1C MFR BLD: 5.2 % (ref 4–5.6)
HCT VFR BLD AUTO: 42.5 % (ref 37–48.5)
HDLC SERPL-MCNC: 47 MG/DL (ref 40–75)
HDLC SERPL: 23.7 % (ref 20–50)
HGB BLD-MCNC: 14.1 G/DL (ref 12–16)
IMM GRANULOCYTES # BLD AUTO: 0.05 K/UL (ref 0–0.04)
IMM GRANULOCYTES NFR BLD AUTO: 0.6 % (ref 0–0.5)
LDLC SERPL CALC-MCNC: 127.2 MG/DL (ref 63–159)
LYMPHOCYTES # BLD AUTO: 2.2 K/UL (ref 1–4.8)
LYMPHOCYTES NFR BLD: 23.8 % (ref 18–48)
MCH RBC QN AUTO: 30.7 PG (ref 27–31)
MCHC RBC AUTO-ENTMCNC: 33.2 G/DL (ref 32–36)
MCV RBC AUTO: 93 FL (ref 82–98)
MONOCYTES # BLD AUTO: 0.6 K/UL (ref 0.3–1)
MONOCYTES NFR BLD: 7 % (ref 4–15)
NEUTROPHILS # BLD AUTO: 6 K/UL (ref 1.8–7.7)
NEUTROPHILS NFR BLD: 65.9 % (ref 38–73)
NONHDLC SERPL-MCNC: 151 MG/DL
NRBC BLD-RTO: 0 /100 WBC
PLATELET # BLD AUTO: 288 K/UL (ref 150–450)
PMV BLD AUTO: 10.2 FL (ref 9.2–12.9)
POTASSIUM SERPL-SCNC: 4.1 MMOL/L (ref 3.5–5.1)
PROT SERPL-MCNC: 7.8 G/DL (ref 6–8.4)
RBC # BLD AUTO: 4.59 M/UL (ref 4–5.4)
SODIUM SERPL-SCNC: 138 MMOL/L (ref 136–145)
TRIGL SERPL-MCNC: 119 MG/DL (ref 30–150)
WBC # BLD AUTO: 9.09 K/UL (ref 3.9–12.7)

## 2024-06-27 PROCEDURE — 83036 HEMOGLOBIN GLYCOSYLATED A1C: CPT | Performed by: FAMILY MEDICINE

## 2024-06-27 PROCEDURE — 36415 COLL VENOUS BLD VENIPUNCTURE: CPT | Mod: PO | Performed by: FAMILY MEDICINE

## 2024-06-27 PROCEDURE — 80053 COMPREHEN METABOLIC PANEL: CPT | Performed by: FAMILY MEDICINE

## 2024-06-27 PROCEDURE — 80061 LIPID PANEL: CPT | Performed by: FAMILY MEDICINE

## 2024-06-27 PROCEDURE — 85025 COMPLETE CBC W/AUTO DIFF WBC: CPT | Performed by: FAMILY MEDICINE

## 2024-07-09 ENCOUNTER — OFFICE VISIT (OUTPATIENT)
Dept: FAMILY MEDICINE | Facility: CLINIC | Age: 59
End: 2024-07-09
Payer: COMMERCIAL

## 2024-07-09 VITALS
OXYGEN SATURATION: 97 % | DIASTOLIC BLOOD PRESSURE: 88 MMHG | SYSTOLIC BLOOD PRESSURE: 136 MMHG | HEART RATE: 75 BPM | TEMPERATURE: 98 F | WEIGHT: 284.19 LBS | BODY MASS INDEX: 45.67 KG/M2 | HEIGHT: 66 IN | RESPIRATION RATE: 18 BRPM

## 2024-07-09 DIAGNOSIS — K76.0 NAFLD (NONALCOHOLIC FATTY LIVER DISEASE): ICD-10-CM

## 2024-07-09 DIAGNOSIS — I10 ESSENTIAL HYPERTENSION: Primary | ICD-10-CM

## 2024-07-09 DIAGNOSIS — E66.01 CLASS 3 SEVERE OBESITY DUE TO EXCESS CALORIES WITH SERIOUS COMORBIDITY AND BODY MASS INDEX (BMI) OF 45.0 TO 49.9 IN ADULT: ICD-10-CM

## 2024-07-09 DIAGNOSIS — F51.04 PSYCHOPHYSIOLOGICAL INSOMNIA: ICD-10-CM

## 2024-07-09 DIAGNOSIS — M51.36 DDD (DEGENERATIVE DISC DISEASE), LUMBAR: ICD-10-CM

## 2024-07-09 DIAGNOSIS — K21.9 GASTROESOPHAGEAL REFLUX DISEASE, UNSPECIFIED WHETHER ESOPHAGITIS PRESENT: ICD-10-CM

## 2024-07-09 DIAGNOSIS — F32.0 CURRENT MILD EPISODE OF MAJOR DEPRESSIVE DISORDER WITHOUT PRIOR EPISODE: ICD-10-CM

## 2024-07-09 DIAGNOSIS — Z86.39 H/O HYPERLIPIDEMIA: ICD-10-CM

## 2024-07-09 PROCEDURE — 3008F BODY MASS INDEX DOCD: CPT | Mod: CPTII,S$GLB,, | Performed by: FAMILY MEDICINE

## 2024-07-09 PROCEDURE — 1160F RVW MEDS BY RX/DR IN RCRD: CPT | Mod: CPTII,S$GLB,, | Performed by: FAMILY MEDICINE

## 2024-07-09 PROCEDURE — 99999 PR PBB SHADOW E&M-EST. PATIENT-LVL IV: CPT | Mod: PBBFAC,,, | Performed by: FAMILY MEDICINE

## 2024-07-09 PROCEDURE — 1159F MED LIST DOCD IN RCRD: CPT | Mod: CPTII,S$GLB,, | Performed by: FAMILY MEDICINE

## 2024-07-09 PROCEDURE — 3079F DIAST BP 80-89 MM HG: CPT | Mod: CPTII,S$GLB,, | Performed by: FAMILY MEDICINE

## 2024-07-09 PROCEDURE — G2211 COMPLEX E/M VISIT ADD ON: HCPCS | Mod: S$GLB,,, | Performed by: FAMILY MEDICINE

## 2024-07-09 PROCEDURE — 99214 OFFICE O/P EST MOD 30 MIN: CPT | Mod: S$GLB,,, | Performed by: FAMILY MEDICINE

## 2024-07-09 PROCEDURE — 3075F SYST BP GE 130 - 139MM HG: CPT | Mod: CPTII,S$GLB,, | Performed by: FAMILY MEDICINE

## 2024-07-09 PROCEDURE — 3044F HG A1C LEVEL LT 7.0%: CPT | Mod: CPTII,S$GLB,, | Performed by: FAMILY MEDICINE

## 2024-07-09 RX ORDER — ESCITALOPRAM OXALATE 20 MG/1
20 TABLET ORAL DAILY
Qty: 90 TABLET | Refills: 3 | Status: SHIPPED | OUTPATIENT
Start: 2024-07-09

## 2024-07-09 RX ORDER — BENZONATATE 200 MG/1
200 CAPSULE ORAL
Status: CANCELLED | OUTPATIENT
Start: 2024-07-09

## 2024-07-09 RX ORDER — OMEPRAZOLE 40 MG/1
40 CAPSULE, DELAYED RELEASE ORAL DAILY
Qty: 90 CAPSULE | Refills: 3 | Status: SHIPPED | OUTPATIENT
Start: 2024-07-09 | End: 2025-07-09

## 2024-07-09 RX ORDER — BENZONATATE 200 MG/1
CAPSULE ORAL
COMMUNITY
Start: 2024-06-17 | End: 2024-07-09

## 2024-07-09 RX ORDER — AMLODIPINE BESYLATE 5 MG/1
5 TABLET ORAL 2 TIMES DAILY
Qty: 180 TABLET | Refills: 3 | Status: SHIPPED | OUTPATIENT
Start: 2024-07-09

## 2024-07-09 RX ORDER — BRIMONIDINE TARTRATE 2 MG/ML
1 SOLUTION/ DROPS OPHTHALMIC 2 TIMES DAILY
COMMUNITY
Start: 2024-06-02

## 2024-07-09 RX ORDER — OMEPRAZOLE 40 MG/1
CAPSULE, DELAYED RELEASE ORAL
COMMUNITY
Start: 2024-03-27 | End: 2024-07-09 | Stop reason: SDUPTHER

## 2024-07-09 RX ORDER — BRIMONIDINE TARTRATE 2 MG/ML
1 SOLUTION/ DROPS OPHTHALMIC 2 TIMES DAILY
COMMUNITY
Start: 2024-06-18

## 2024-07-09 RX ORDER — NIRMATRELVIR AND RITONAVIR 300-100 MG
KIT ORAL
COMMUNITY
Start: 2024-06-17 | End: 2024-07-09

## 2024-07-09 RX ORDER — CELECOXIB 200 MG/1
200 CAPSULE ORAL DAILY
Qty: 90 CAPSULE | Refills: 3 | Status: SHIPPED | OUTPATIENT
Start: 2024-07-09

## 2024-07-09 NOTE — PROGRESS NOTES
Assessment & Plan:    Essential hypertension  -     amLODIPine (NORVASC) 5 MG tablet; Take 1 tablet (5 mg total) by mouth 2 (two) times daily.  Dispense: 180 tablet; Refill: 3    Controlled. Medication(s) refilled.     H/O hyperlipidemia  Lipid panel has improved since last visit.     NAFLD (nonalcoholic fatty liver disease)  Class 3 severe obesity due to excess calories with serious comorbidity and body mass index (BMI) of 45.0 to 49.9 in adult  Continue healthy lifestyle and diet to minimize risk of CVD and progression of NAFLD.    DDD (degenerative disc disease), lumbar  -     celecoxib (CELEBREX) 200 MG capsule; Take 1 capsule (200 mg total) by mouth once daily.  Dispense: 90 capsule; Refill: 3    Controlled. Continue current therapy.     Current mild episode of major depressive disorder without prior episode  -     EScitalopram oxalate (LEXAPRO) 20 MG tablet; Take 1 tablet (20 mg total) by mouth once daily.  Dispense: 90 tablet; Refill: 3    Controlled. Medication(s) refilled.     Psychophysiological insomnia  Controlled. Continue current therapy.     Gastroesophageal reflux disease, unspecified whether esophagitis present  -     omeprazole (PRILOSEC) 40 MG capsule; Take 1 capsule (40 mg total) by mouth once daily.  Dispense: 90 capsule; Refill: 3    Controlled. Medication(s) refilled.       Health maintenance reviewed:  -Recommended cervical cancer screening, which she will consider having done in the future.     Encouraged RSV vaccine.     Follow-up: Follow up in about 1 year (around 7/9/2025).  ______________________________________________________________________    Chief Complaint  Chief Complaint   Patient presents with    Hypertension       HPI  Margie Cifuentes is a 59 y.o. female with medical diagnoses as listed in the medical history and problem list that presents to the office to follow up on her chronic conditions. She is in her usual state of health today and does not report any new concerns.      Most recent pertinent workup:     Last CBC Results:   Lab Results   Component Value Date    WBC 9.09 2024    HGB 14.1 2024    HCT 42.5 2024     2024       Last CMP Results  Lab Results   Component Value Date     2024    K 4.1 2024     2024    CO2 25 2024    BUN 12 2024    CREATININE 1.0 2024    CALCIUM 9.8 2024    ALBUMIN 3.5 2024    AST 23 2024    ALT 30 2024       Last Lipids  Lab Results   Component Value Date    CHOL 198 2024    TRIG 119 2024    HDL 47 2024    LDLCALC 127.2 2024       Last A1C  Lab Results   Component Value Date    HGBA1C 5.2 2024         Health Maintenance         Date Due Completion Date    COVID-19 Vaccine (3 - Pfizer risk series) 09/10/2021 2021    Cervical Cancer Screening 10/10/2022 10/10/2019    RSV Vaccine (Age 60+ and Pregnant patients) (1 - 1-dose 60+ series) Never done ---    Influenza Vaccine (1) 2024 10/25/2022    Hemoglobin A1c 2025    Colorectal Cancer Screening 2027    Lipid Panel 2029    TETANUS VACCINE 2029              PAST MEDICAL HISTORY:  Past Medical History:   Diagnosis Date    B12 deficiency anemia     Intramuscular replacement    Benign hypertension     Breast cancer     Left-sided diagnosed  status post mastectomy chemotherapy and radiation, focal atypical ductal hyperplasia of the right status post mastectomy 2009    Chronic depression     GERD (gastroesophageal reflux disease)     Hypercalcemia syndrome     Hyperlipidemia LDL goal <130     Hyperparathyroidism due to vitamin D deficiency     Insomnia     Lumbar radiculitis     Lymphedema of arm     Morbid obesity     Vitamin D deficiency disease        PAST SURGICAL HISTORY:  Past Surgical History:   Procedure Laterality Date    BREAST RECONSTRUCTION      Left and right     x2       COLONOSCOPY N/A 6/5/2024    Procedure: COLONOSCOPY;  Surgeon: Danielle Pérez MD;  Location: [facility];  Service: Endoscopy;  Laterality: N/A;  Ref by Dr JOSE Sy, PEG, portal - PC  5/29/24- pc complete. DBM    Debridement of both breasts      2009    Debridement of leg      2009    EYE MUSCLE SURGERY      Gastric sleeve  10/04/2022    Left mastectomy      2003    Leg grafting      Left leg 2009, donor site right leg    LUMBAR SPINE SURGERY      2008    Right mastectomy      2009    TUBAL LIGATION         SOCIAL HISTORY:  Social History     Socioeconomic History    Marital status:     Number of children: 2    Years of education: [employer]   Occupational History    Occupation: Works for a [employer] company     Employer: [employer]   Tobacco Use    Smoking status: Never     Passive exposure: Never    Smokeless tobacco: Never   Substance and Sexual Activity    Alcohol use: No    Drug use: No    Sexual activity: Yes     Partners: Male   Social History Narrative    Adult Screenings updated and reviewed  4/10/14    Mammogram( for females) history of bilateral mastectomy    Pap ( for females)- years      Colonoscopy - done 2006 normal    Flu shot - 2013    Td 2005    Pneumovax updated 4/10/14 due again  2019    Zostavax recommended one time at  age  59    Eye exam  Overdue     Bone density overdue      Social Determinants of Health     Financial Resource Strain: Low Risk  (7/8/2024)    Overall Financial Resource Strain (CARDIA)     Difficulty of Paying Living Expenses: Not hard at all   Food Insecurity: No Food Insecurity (7/8/2024)    Hunger Vital Sign     Worried About Running Out of Food in the Last Year: Never true     Ran Out of Food in the Last Year: Never true   Physical Activity: Unknown (7/8/2024)    Exercise Vital Sign     Days of Exercise per Week: 1 day   Stress: No Stress Concern Present (7/8/2024)    [employer] [employer] - Occupational Stress Questionnaire     Feeling of Stress :  Only a little   Housing Stability: Unknown (7/8/2024)    Housing Stability Vital Sign     Unable to Pay for Housing in the Last Year: No       FAMILY HISTORY:  Family History   Problem Relation Name Age of Onset    COPD Mother      Heart disease Father      Hypertension Father      Hyperlipidemia Father      COPD Sister      Stroke Brother Stroke     Cirrhosis Neg Hx         ALLERGIES AND MEDICATIONS: updated and reviewed.  Review of patient's allergies indicates:   Allergen Reactions    Codeine Nausea Only and Nausea And Vomiting     Current Outpatient Medications   Medication Sig Dispense Refill    brimonidine 0.025 % Drop       brimonidine 0.2% (ALPHAGAN) 0.2 % Drop Apply 1 drop to eye 2 (two) times daily.      brimonidine 0.2% (ALPHAGAN) 0.2 % Drop Place 1 drop into both eyes 2 (two) times daily.      buPROPion (WELLBUTRIN XL) 150 MG TB24 tablet Take 1 tablet (150 mg total) by mouth once daily. 30 tablet 11    cetirizine (ZYRTEC) 10 MG tablet Take 10 mg by mouth once daily.      fluticasone propionate (FLONASE) 50 mcg/actuation nasal spray 1 spray by Each Nostril route.      ipratropium (ATROVENT) 42 mcg (0.06 %) nasal spray 2 sprays by Each Nostril route 2 (two) times daily. 15 mL 11    LORazepam (ATIVAN) 1 MG tablet Take 1 tablet (1 mg total) by mouth every evening. 30 tablet 2    minoxidiL (LONITEN) 2.5 MG tablet Take 1 tablet (2.5 mg total) by mouth once daily. 90 tablet 3    amLODIPine (NORVASC) 5 MG tablet Take 1 tablet (5 mg total) by mouth 2 (two) times daily. 180 tablet 3    celecoxib (CELEBREX) 200 MG capsule Take 1 capsule (200 mg total) by mouth once daily. 90 capsule 3    EScitalopram oxalate (LEXAPRO) 20 MG tablet Take 1 tablet (20 mg total) by mouth once daily. 90 tablet 3    omeprazole (PRILOSEC) 40 MG capsule Take 1 capsule (40 mg total) by mouth once daily. 90 capsule 3     No current facility-administered medications for this visit.         ROS  Review of Systems   Musculoskeletal:  Positive  "for back pain (controlled).   Psychiatric/Behavioral:  Negative for dysphoric mood.            Physical Exam  Vitals:    07/09/24 1616   BP: 136/88   Pulse: 75   Resp: 18   Temp: 98.2 °F (36.8 °C)   TempSrc: Oral   SpO2: 97%   Weight: 128.9 kg (284 lb 2.8 oz)   Height: 5' 6" (1.676 m)    Body mass index is 45.87 kg/m².  Weight: 128.9 kg (284 lb 2.8 oz)   Height: 5' 6" (167.6 cm)   Physical Exam  Constitutional:       General: She is not in acute distress.     Appearance: She is obese.   HENT:      Head: Normocephalic and atraumatic.   Neck:      Thyroid: No thyromegaly.      Vascular: No carotid bruit.   Cardiovascular:      Rate and Rhythm: Normal rate and regular rhythm.      Pulses: Normal pulses.      Heart sounds: Normal heart sounds.   Pulmonary:      Effort: Pulmonary effort is normal. No respiratory distress.      Breath sounds: Normal breath sounds.   Musculoskeletal:      Cervical back: Neck supple.      Right lower leg: No edema.      Left lower leg: No edema.   Lymphadenopathy:      Cervical: No cervical adenopathy.   Skin:     General: Skin is warm and dry.      Findings: No rash.   Neurological:      General: No focal deficit present.      Mental Status: She is alert and oriented to person, place, and time.   Psychiatric:         Mood and Affect: Mood normal.         Behavior: Behavior normal.         Thought Content: Thought content normal.             "

## 2024-07-19 ENCOUNTER — TELEPHONE (OUTPATIENT)
Dept: HEPATOLOGY | Facility: CLINIC | Age: 59
End: 2024-07-19
Payer: COMMERCIAL

## 2024-07-19 NOTE — TELEPHONE ENCOUNTER
Provider will not be in clinic August 28, 2024.  Patient's upcoming appointment has been changed to virtual.  Fibroscan has been scheduled on Wednesday, August 14, 2024 at 10:00AM.  Patient confirmed

## 2024-08-02 ENCOUNTER — PATIENT OUTREACH (OUTPATIENT)
Dept: ADMINISTRATIVE | Facility: HOSPITAL | Age: 59
End: 2024-08-02
Payer: COMMERCIAL

## 2024-08-08 DIAGNOSIS — K21.9 GASTROESOPHAGEAL REFLUX DISEASE, UNSPECIFIED WHETHER ESOPHAGITIS PRESENT: ICD-10-CM

## 2024-08-09 RX ORDER — ESOMEPRAZOLE MAGNESIUM 40 MG/1
40 CAPSULE, DELAYED RELEASE ORAL DAILY
Qty: 90 CAPSULE | Refills: 3 | Status: SHIPPED | OUTPATIENT
Start: 2024-08-09

## 2024-08-14 ENCOUNTER — PROCEDURE VISIT (OUTPATIENT)
Dept: HEPATOLOGY | Facility: CLINIC | Age: 59
End: 2024-08-14
Payer: COMMERCIAL

## 2024-08-14 DIAGNOSIS — K74.01 HEPATIC FIBROSIS, EARLY FIBROSIS: ICD-10-CM

## 2024-08-14 DIAGNOSIS — K76.0 NAFLD (NONALCOHOLIC FATTY LIVER DISEASE): ICD-10-CM

## 2024-08-14 PROCEDURE — 91200 LIVER ELASTOGRAPHY: CPT | Mod: S$GLB,,, | Performed by: NURSE PRACTITIONER

## 2024-08-16 NOTE — PROCEDURES
FibroScan [place] (Vibration Controlled Transient Elastography)    Date/Time: 8/14/2024 10:00 AM    Performed by: Naima Pat NP  Authorized by: Naima Pat NP    Diagnosis:  NAFLD    Probe:  XL    Universal Protocol: Patient's identity, procedure and site were verified, confirmatory pause was performed.  Discussed procedure including risks and potential complications.  Questions answered.  Patient verbalizes understanding and wishes to proceed with VCTE.     Procedure: After providing explanations of the procedure, patient was placed in the supine position with right arm in maximum abduction to allow optimal exposure of right lateral abdomen.  Patient was briefly assessed, Testing was performed in the mid-axillary location, 50Hz Shear Wave pulses were applied and the resulting Shear Wave and Propagation Speed detected with a 3.5 MHz ultrasonic signal, using the FibroScan probe, Skin to liver capsule distance and liver parenchyma were accessed during the entire examination with the FibroScan probe, Patient was instructed to breathe normally and to abstain from sudden movements during the procedure, allowing for random measurements of liver stiffness. At least 10 Shear Waves were produced, Individual measurements of each Shear Wave were calculated.  Patient tolerated the procedure well with no complications.  Meets discharge criteria as was dismissed.  Rates pain 0 out of 10.  Patient will follow up with ordering provider to review results.    Findings  Median liver stiffness score:  19.7  CAP Reading: dB/m:  338    IQR/med %:  21  Interpretation  Fibrosis interpretation is based on medial liver stiffness - Kilopascal (kPa).    Fibrosis Stage:  F4  Steatosis interpretation is based on controlled attenuation parameter - (dB/m).    Steatosis Grade:  S3

## 2024-08-23 DIAGNOSIS — F51.04 PSYCHOPHYSIOLOGICAL INSOMNIA: ICD-10-CM

## 2024-08-23 RX ORDER — LORAZEPAM 1 MG/1
1 TABLET ORAL NIGHTLY
Qty: 30 TABLET | Refills: 2 | Status: SHIPPED | OUTPATIENT
Start: 2024-08-23

## 2024-08-23 NOTE — TELEPHONE ENCOUNTER
No care due was identified.  [facility] Embedded Care Due Messages. Reference number: [id number].   8/23/2024 12:13:41 PM CDT

## 2024-08-28 ENCOUNTER — OFFICE VISIT (OUTPATIENT)
Facility: CLINIC | Age: 59
End: 2024-08-28
Payer: COMMERCIAL

## 2024-08-28 DIAGNOSIS — E66.01 CLASS 3 SEVERE OBESITY WITH SERIOUS COMORBIDITY AND BODY MASS INDEX (BMI) OF 45.0 TO 49.9 IN ADULT, UNSPECIFIED OBESITY TYPE: ICD-10-CM

## 2024-08-28 DIAGNOSIS — K86.2 PANCREATIC CYST: ICD-10-CM

## 2024-08-28 DIAGNOSIS — E78.5 HYPERLIPIDEMIA LDL GOAL <130: ICD-10-CM

## 2024-08-28 DIAGNOSIS — E88.01 LOW PLASMA ALPHA-1 ANTITRYPSIN: ICD-10-CM

## 2024-08-28 DIAGNOSIS — K74.00 HEPATIC FIBROSIS: ICD-10-CM

## 2024-08-28 DIAGNOSIS — K76.0 METABOLIC DYSFUNCTION-ASSOCIATED STEATOTIC LIVER DISEASE (MASLD): Primary | ICD-10-CM

## 2024-08-28 PROCEDURE — 99214 OFFICE O/P EST MOD 30 MIN: CPT | Mod: 95,,, | Performed by: NURSE PRACTITIONER

## 2024-08-28 PROCEDURE — 3044F HG A1C LEVEL LT 7.0%: CPT | Mod: CPTII,95,, | Performed by: NURSE PRACTITIONER

## 2024-08-28 NOTE — PROGRESS NOTES
[facility] - Established Patient    Last Clinic Visit: 5/10/23      HISTORY     This is a 59 y.o. female with PMH noted below, here for follow-up of fatty liver with hepatic fibrosis.    Fatty liver first noted on CT AP 10/25/21.     Risk factors for fatty liver include-  Obesity, BMI >40  HTN, HLD, JOSEPHINE  No h/o heavy alcohol use     She has a h/o BRCA s/p bilateral mastectomy + adjuvant chemotherapy/radiation + 5 years of tamoxifen/femara.    Her ALT has been elevated since at least 2006, 40-60s.     Serologic workup has been negative for Sami's, hemochromatosis, autoimmune etiology, and viral hepatitis.     A1AT level mildly low, phenotype MZ.  Has had PFTs at [facility] a few years ago; reports testing was slightly abnormal.     Fibrosis staging:   MRI elastography 12/1/21: moderate to severe steatosis, F1-2 fibrosis  Fibroscan 5/2023 = F2 (kPa 8.5), S3   Fibroscan 8/2024 = F4 (kPa 19.7), S3      Interval history:  Feels well, no concerns today.  Denies symptoms of hepatic decompensation including jaundice, ascites, cognitive problems to suggest hepatic encephalopathy, or GI bleeding.     Updates on risk factors for fatty liver:     Weight -- BMI 45   Had gastric sleeve surgery 10/4/22  Had lost 80 lb though gained most of this back                   Dyslipidemia -- well controlled                                Insulin resistance / diabetes -- no         Hypertension -- well controlled  Alcohol use -- does not typically drink    Liver enzymes: WNL    Health Maintenance:  -- Last imaging: abd US 5/3/24 without focal hepatic lesion  -- Hepatitis A & B vaccination: complete          Past medical history, surgical history, problem list, family history, social history, allergies: Reviewed and updated in the appropriate section of the electronic medical record.      Current Outpatient Medications   Medication Sig Dispense Refill    amLODIPine (NORVASC) 5 MG tablet Take 1 tablet (5 mg total) by mouth 2 (two)  times daily. 180 tablet 3    brimonidine 0.025 % Drop       brimonidine 0.2% (ALPHAGAN) 0.2 % Drop Apply 1 drop to eye 2 (two) times daily.      brimonidine 0.2% (ALPHAGAN) 0.2 % Drop Place 1 drop into both eyes 2 (two) times daily.      buPROPion (WELLBUTRIN XL) 150 MG TB24 tablet Take 1 tablet (150 mg total) by mouth once daily. 30 tablet 11    celecoxib (CELEBREX) 200 MG capsule Take 1 capsule (200 mg total) by mouth once daily. 90 capsule 3    cetirizine (ZYRTEC) 10 MG tablet Take 10 mg by mouth once daily.      EScitalopram oxalate (LEXAPRO) 20 MG tablet Take 1 tablet (20 mg total) by mouth once daily. 90 tablet 3    esomeprazole (NEXIUM) 40 MG capsule Take 1 capsule (40 mg total) by mouth once daily. 90 capsule 3    fluticasone propionate (FLONASE) 50 mcg/actuation nasal spray 1 spray by Each Nostril route.      ipratropium (ATROVENT) 42 mcg (0.06 %) nasal spray 2 sprays by Each Nostril route 2 (two) times daily. 15 mL 11    LORazepam (ATIVAN) 1 MG tablet Take 1 tablet (1 mg total) by mouth every evening. 30 tablet 2    minoxidiL (LONITEN) 2.5 MG tablet Take 1 tablet (2.5 mg total) by mouth once daily. 90 tablet 3     No current facility-administered medications for this visit.     Medication list reviewed and updated.      Review of Systems - as per HPI  Constitutional: Negative for unexpected weight change.   Respiratory: Negative for shortness of breath.    Cardiovascular: Negative for leg swelling.  Gastrointestinal: Negative for abdominal distention or abdominal pain. Negative for melena or hematemesis.  Musculoskeletal: Negative for myalgias.    Skin: Negative for jaundice or itching.  Neurological: Negative for confusion or slowed mentation. Negative for tremors.   Hematological: Does not bruise/bleed easily.       Physical Exam - limited by virtual visit  Constitutional: No distress. Alert and oriented.  Pulmonary/Chest: No respiratory distress.   Skin: No jaundice.   Psychiatric: Normal mood and  "affect. Speech, behavior, and thought content normal.         LABS & DIAGNOSTIC STUDIES     I have personally reviewed pertinent laboratory findings:    Lab Results   Component Value Date    ALT 30 06/27/2024    AST 23 06/27/2024    ALKPHOS 129 06/27/2024    BILITOT 0.5 06/27/2024    ALBUMIN 3.5 06/27/2024       Lab Results   Component Value Date    WBC 9.09 06/27/2024    HGB 14.1 06/27/2024    HCT 42.5 06/27/2024    MCV 93 06/27/2024     06/27/2024       Lab Results   Component Value Date     06/27/2024    K 4.1 06/27/2024    BUN 12 06/27/2024    CREATININE 1.0 06/27/2024    ESTGFRAFRICA >60 10/25/2021    EGFRNONAA >60 10/25/2021       Lab Results   Component Value Date    SMOOTHMUSCAB Negative 1:40 12/01/2021    AMAIFA Negative 1:40 12/01/2021    IGGSERUM 1571 12/01/2021    ANASCREEN Negative <1:80 12/01/2021    FERRITIN 297 07/18/2013    FESATURATED 26 07/18/2013    OFDEA7ZJWGMY SEE BELOW 05/03/2023    MFFMJ7SFHGLX 92 (L) 05/03/2023    CERULOPLSM 30.0 12/01/2021    HEPBSAG Negative 01/06/2009    HEPBIGM Negative 01/06/2009    HEPBCAB Negative 12/01/2021    HEPCAB Negative 01/06/2009    HEPAIGM Negative 01/06/2009    [id number] Non-reactive 08/03/2023       No results found for: "AFP"    I have personally reviewed the following result reports:  CT - 10/25/21  MRI - 12/1/21  US - 5/3/24      ASSESSMENT & PLAN     59 y.o. female with:    1. Metabolic dysfunction-associated steatotic liver disease (MASLD)    2. Hepatic fibrosis    3. Pancreatic cyst    4. Low plasma alpha-1 antitrypsin    5. Hyperlipidemia LDL goal <130    6. Class 3 severe obesity with serious comorbidity and body mass index (BMI) of 45.0 to 49.9 in adult, unspecified obesity type        MRI elasto and Fibroscan have previously suggested mild-moderate fibrosis, F1-2. Latest Fibroscan suggests cirrhosis though suspect this may be an over-estimate as weight is up (BMI 45). Her liver enzymes have previously normalized with weight loss " though remain WNL at this time.     Recommendations:   MRI elasto now to further evaluate fibrosis staging. Will add MRI w/wo contrast given pancreatic cyst on US.   Pending MRI results, may be eligible for Rezdiffra as additional fatty liver treatment. Discussed med goals/indications, ultimately to prevent fibrosis progression.   Labs to monitor liver enzymes/LFTs every 6 months, which can include labs with PCP  Encouraged ongoing weight loss efforts. Mediterranean diet - diet should be low in carbohydrates, added sugars, and processed foods. Recommend increasing protein and fiber intake.  150 min/week of moderate exercise (aerobic + resistance), as tolerated  Maintain good control of blood pressure, cholesterol, and blood sugar      Orders Placed This Encounter   Procedures    MRI Abdomen W WO Contrast    MR Elastography    Creatinine, serum       F/u after MRIs to discuss results.       Thank you for allowing me to participate in the care of Margie Pat, FNP-C  Hepatology          The patient location is: [place], [place]  The chief complaint leading to consultation is: F/u for fatty liver    Visit type: audiovisual    Face to Face time with patient: 21 min  30 minutes of total time spent on the encounter, which includes face to face time and non-face to face time preparing to see the patient (eg, review of tests), Obtaining and/or reviewing separately obtained history, Documenting clinical information in the electronic or other health record, Independently interpreting results (not separately reported) and communicating results to the patient/family/caregiver, or Care coordination (not separately reported).     Each patient to whom he or she provides medical services by telemedicine is:  (1) informed of the relationship between the physician and patient and the respective role of any other health care provider with respect to management of the patient; and (2) notified that he or she may  decline to receive medical services by telemedicine and may withdraw from such care at any time.

## 2024-09-19 ENCOUNTER — HOSPITAL ENCOUNTER (OUTPATIENT)
Dept: RADIOLOGY | Facility: HOSPITAL | Age: 59
Discharge: HOME | End: 2024-09-19
Attending: NURSE PRACTITIONER
Payer: COMMERCIAL

## 2024-09-19 DIAGNOSIS — K74.00 HEPATIC FIBROSIS: ICD-10-CM

## 2024-09-19 DIAGNOSIS — K76.0 METABOLIC DYSFUNCTION-ASSOCIATED STEATOTIC LIVER DISEASE (MASLD): ICD-10-CM

## 2024-09-19 DIAGNOSIS — K86.2 PANCREATIC CYST: ICD-10-CM

## 2024-09-19 PROCEDURE — A9585 GADOBUTROL INJECTION: HCPCS | Performed by: NURSE PRACTITIONER

## 2024-09-19 PROCEDURE — 74183 MRI ABD W/O CNTR FLWD CNTR: CPT | Mod: 26,,, | Performed by: STUDENT IN AN ORGANIZED HEALTH CARE EDUCATION/TRAINING PROGRAM

## 2024-09-19 PROCEDURE — 76391 MR ELASTOGRAPHY: CPT | Mod: TC

## 2024-09-19 PROCEDURE — 76391 MR ELASTOGRAPHY: CPT | Mod: 26,,, | Performed by: STUDENT IN AN ORGANIZED HEALTH CARE EDUCATION/TRAINING PROGRAM

## 2024-09-19 PROCEDURE — 25500020 PHARM REV CODE 255: Performed by: NURSE PRACTITIONER

## 2024-09-19 PROCEDURE — 74183 MRI ABD W/O CNTR FLWD CNTR: CPT | Mod: TC

## 2024-09-19 RX ORDER — GADOBUTROL 604.72 MG/ML
10 INJECTION INTRAVENOUS
Status: COMPLETED | OUTPATIENT
Start: 2024-09-19 | End: 2024-09-19

## 2024-09-19 RX ADMIN — GADOBUTROL 10 ML: 604.72 INJECTION INTRAVENOUS at 07:09

## 2024-09-25 ENCOUNTER — OFFICE VISIT (OUTPATIENT)
Facility: CLINIC | Age: 59
End: 2024-09-25
Payer: COMMERCIAL

## 2024-09-25 DIAGNOSIS — K76.0 METABOLIC DYSFUNCTION-ASSOCIATED STEATOTIC LIVER DISEASE (MASLD): Primary | ICD-10-CM

## 2024-09-25 DIAGNOSIS — E66.01 CLASS 3 SEVERE OBESITY WITH BODY MASS INDEX (BMI) OF 45.0 TO 49.9 IN ADULT, UNSPECIFIED OBESITY TYPE, UNSPECIFIED WHETHER SERIOUS COMORBIDITY PRESENT: ICD-10-CM

## 2024-09-25 DIAGNOSIS — K86.2 PANCREATIC CYST: ICD-10-CM

## 2024-09-25 DIAGNOSIS — E88.01 LOW PLASMA ALPHA-1 ANTITRYPSIN: ICD-10-CM

## 2024-09-25 DIAGNOSIS — K74.01 HEPATIC FIBROSIS, EARLY FIBROSIS: ICD-10-CM

## 2024-09-25 DIAGNOSIS — E78.5 HYPERLIPIDEMIA LDL GOAL <130: ICD-10-CM

## 2024-09-25 PROCEDURE — 99214 OFFICE O/P EST MOD 30 MIN: CPT | Mod: 95,,, | Performed by: NURSE PRACTITIONER

## 2024-09-25 PROCEDURE — 3044F HG A1C LEVEL LT 7.0%: CPT | Mod: CPTII,95,, | Performed by: NURSE PRACTITIONER

## 2024-09-25 NOTE — PROGRESS NOTES
[facility] - Established Patient    Last Clinic Visit: 8/28/24      HISTORY     This is a 59 y.o. female with PMH noted below, here for follow-up of fatty liver with hepatic fibrosis.    Fatty liver first noted on CT AP 10/25/21.     Risk factors for fatty liver include-  Obesity, BMI >40  HTN, HLD, JOSEPHINE  No h/o heavy alcohol use     She has a h/o BRCA s/p bilateral mastectomy + adjuvant chemotherapy/radiation + 5 years of tamoxifen/femara.    Her ALT has been elevated since at least 2006, 40-60s.     Serologic workup has been negative for Sami's, hemochromatosis, autoimmune etiology, and viral hepatitis.     A1AT level mildly low, phenotype MZ.  Has had PFTs at [facility] a few years ago; reports testing was slightly abnormal.     Fibrosis staging:   MRI elastography 12/1/21: moderate to severe steatosis, F1-2 fibrosis  Fibroscan 5/2023 = F2 (kPa 8.5), S3   Fibroscan 8/2024 = F4 (kPa 19.7), S3    MRI elasto 9/2024 = moderate-severe steatosis, no fibrosis (F0)    Interval history:  Here to discuss recent MRI results. 1 cm pancreatic cyst noted; no abnormal enhancement.     Denies symptoms of hepatic decompensation including jaundice, ascites, cognitive problems to suggest hepatic encephalopathy, or GI bleeding.     Updates on risk factors for fatty liver:  Weight -- BMI 45   Had gastric sleeve surgery 10/4/22  Had lost 80 lb though gained most of this back                   Dyslipidemia -- well controlled                                Insulin resistance / diabetes -- no         Alcohol use -- does not typically drink    Liver enzymes: WNL    Health Maintenance:  -- Last imaging: MRI 9/19/24 without focal hepatic lesion   -- Hepatitis A & B vaccination: complete          Past medical history, surgical history, problem list, family history, social history, allergies: Reviewed and updated in the appropriate section of the electronic medical record.      Current Outpatient Medications   Medication Sig  Dispense Refill    amLODIPine (NORVASC) 5 MG tablet Take 1 tablet (5 mg total) by mouth 2 (two) times daily. 180 tablet 3    brimonidine 0.025 % Drop       brimonidine 0.2% (ALPHAGAN) 0.2 % Drop Apply 1 drop to eye 2 (two) times daily.      brimonidine 0.2% (ALPHAGAN) 0.2 % Drop Place 1 drop into both eyes 2 (two) times daily.      buPROPion (WELLBUTRIN XL) 150 MG TB24 tablet Take 1 tablet (150 mg total) by mouth once daily. 30 tablet 11    celecoxib (CELEBREX) 200 MG capsule Take 1 capsule (200 mg total) by mouth once daily. 90 capsule 3    cetirizine (ZYRTEC) 10 MG tablet Take 10 mg by mouth once daily.      EScitalopram oxalate (LEXAPRO) 20 MG tablet Take 1 tablet (20 mg total) by mouth once daily. 90 tablet 3    esomeprazole (NEXIUM) 40 MG capsule Take 1 capsule (40 mg total) by mouth once daily. 90 capsule 3    fluticasone propionate (FLONASE) 50 mcg/actuation nasal spray 1 spray by Each Nostril route.      ipratropium (ATROVENT) 42 mcg (0.06 %) nasal spray 2 sprays by Each Nostril route 2 (two) times daily. 15 mL 11    LORazepam (ATIVAN) 1 MG tablet Take 1 tablet (1 mg total) by mouth every evening. 30 tablet 2    minoxidiL (LONITEN) 2.5 MG tablet Take 1 tablet (2.5 mg total) by mouth once daily. 90 tablet 3     No current facility-administered medications for this visit.     Medication list reviewed and updated.      Review of Systems - as per HPI  Constitutional: Negative for unexpected weight change.   Respiratory: Negative for shortness of breath.    Cardiovascular: Negative for leg swelling.  Gastrointestinal: Negative for abdominal distention or abdominal pain. Negative for melena or hematemesis.  Musculoskeletal: Negative for myalgias.    Skin: Negative for jaundice or itching.  Neurological: Negative for confusion or slowed mentation. Negative for tremors.   Hematological: Does not bruise/bleed easily.       Physical Exam - limited by virtual visit  Constitutional: No distress. Alert and  "oriented.  Pulmonary/Chest: No respiratory distress.   Skin: No jaundice.   Psychiatric: Normal mood and affect. Speech, behavior, and thought content normal.         LABS & DIAGNOSTIC STUDIES     I have personally reviewed pertinent laboratory findings:    Lab Results   Component Value Date    ALT 30 06/27/2024    AST 23 06/27/2024    ALKPHOS 129 06/27/2024    BILITOT 0.5 06/27/2024    ALBUMIN 3.5 06/27/2024       Lab Results   Component Value Date    WBC 9.09 06/27/2024    HGB 14.1 06/27/2024    HCT 42.5 06/27/2024    MCV 93 06/27/2024     06/27/2024       Lab Results   Component Value Date     06/27/2024    K 4.1 06/27/2024    BUN 12 06/27/2024    CREATININE 1.0 06/27/2024    ESTGFRAFRICA >60 10/25/2021    EGFRNONAA >60 10/25/2021       Lab Results   Component Value Date    SMOOTHMUSCAB Negative 1:40 12/01/2021    AMAIFA Negative 1:40 12/01/2021    IGGSERUM 1571 12/01/2021    ANASCREEN Negative <1:80 12/01/2021    FERRITIN 297 07/18/2013    FESATURATED 26 07/18/2013    XOCGK0FTFHGI SEE BELOW 05/03/2023    UIEJW0OZMPFN 92 (L) 05/03/2023    CERULOPLSM 30.0 12/01/2021    HEPBSAG Negative 01/06/2009    HEPBIGM Negative 01/06/2009    HEPBCAB Negative 12/01/2021    HEPCAB Negative 01/06/2009    HEPAIGM Negative 01/06/2009    QXR67HTMC Non-reactive 08/03/2023       No results found for: "AFP"    I have personally reviewed the following result reports:  CT - 10/25/21  MRI - 9/19/24  US - 5/3/24      ASSESSMENT & PLAN     59 y.o. female with:    1. Metabolic dysfunction-associated steatotic liver disease (MASLD)    2. Hepatic fibrosis, early fibrosis    3. Hyperlipidemia LDL goal <130    4. Class 3 severe obesity with body mass index (BMI) of 45.0 to 49.9 in adult, unspecified obesity type, unspecified whether serious comorbidity present    5. Pancreatic cyst    6. Low plasma alpha-1 antitrypsin        MRI elasto and Fibroscan have previously suggested mild-moderate fibrosis, F1-2. Latest Fibroscan suggested " cirrhosis though suspect this may be an over-estimate given weight gain (BMI 45). Her liver enzymes have previously normalized with weight loss and remain WNL at this time.     MRI elastography repeated and suggests no fibrosis (F0).     Recommendations:   Reassured the majority of her fibrosis staging has not suggested advanced fibrosis/cirrhosis. May need to continue monitoring with MRI elasto given BMI. Does not appear to be eligible for Rezdiffra at this time.   Labs to monitor liver enzymes/LFTs every 6 months, which can include labs with PCP  [place] [facility] [place] [facility] [place] If she is to have another MRI in 1 year, can add elastography.   Encouraged ongoing weight loss efforts. She would likely benefit from GLP-1 and she plans to look into this.    Mediterranean diet - diet should be low in carbohydrates, added sugars, and processed foods. Recommend increasing protein and fiber intake.  150 min/week of moderate exercise (aerobic + resistance), as tolerated  Maintain good control of blood pressure, cholesterol, and blood sugar      F/u in 1 year.      Thank you for allowing me to participate in the care of JIMMIE Melgar-C  Hepatology          The patient location is: [place]  The chief complaint leading to consultation is: F/u for fatty liver    Visit type: audiovisual    Face to Face time with patient: 21 min  30 minutes of total time spent on the encounter, which includes face to face time and non-face to face time preparing to see the patient (eg, review of tests), Obtaining and/or reviewing separately obtained history, Documenting clinical information in the electronic or other health record, Independently interpreting results (not separately reported) and communicating results to the patient/family/caregiver, or Care coordination (not separately reported).     Each patient to whom he or she provides medical services by telemedicine is:  (1) informed of the  relationship between the physician and patient and the respective role of any other health care provider with respect to management of the patient; and (2) notified that he or she may decline to receive medical services by telemedicine and may withdraw from such care at any time.

## 2024-09-25 NOTE — Clinical Note
Hi, can she be scheduled for a consult in pancreatic cyst clinic? Let me know if a referral is needed or if I should message a specific pool. I forgot if the process changed to get patients in. Thanks. -Naima

## 2024-09-28 DIAGNOSIS — I10 ESSENTIAL HYPERTENSION: ICD-10-CM

## 2024-09-28 NOTE — TELEPHONE ENCOUNTER
No care due was identified.  Health [facility] Embedded Care Due Messages. Reference number: [id number].   9/28/2024 10:08:29 AM CDT

## 2024-09-29 NOTE — TELEPHONE ENCOUNTER
Refill Routing Note   Medication(s) are not appropriate for processing by [facility] for the following reason(s):        Other: Pharmacy comment: Alternative Requested:CHANGE TO 10MG TABLET  1 TABLET DAILY.     [facility] action(s):  Defer      Medication Therapy Plan: Pharmacy comment: Alternative Requested:CHANGE TO 10MG TABLET  1 TABLET DAILY.      Appointments  past 12m or future 3m with PCP    Date Provider   Last Visit   7/9/2024 Diana Sy, DO   Next Visit   Visit date not found Diana Sy, DO   ED visits in past 90 days: 0        Note composed:10:28 PM 09/28/2024

## 2024-09-30 ENCOUNTER — TELEPHONE (OUTPATIENT)
Dept: GASTROENTEROLOGY | Facility: CLINIC | Age: 59
End: 2024-09-30
Payer: COMMERCIAL

## 2024-09-30 DIAGNOSIS — L65.9 ALOPECIA: ICD-10-CM

## 2024-09-30 DIAGNOSIS — Z56.6 STRESS AT WORK: ICD-10-CM

## 2024-09-30 DIAGNOSIS — F32.0 CURRENT MILD EPISODE OF MAJOR DEPRESSIVE DISORDER WITHOUT PRIOR EPISODE: ICD-10-CM

## 2024-09-30 RX ORDER — BUPROPION HYDROCHLORIDE 150 MG/1
150 TABLET ORAL
Qty: 90 TABLET | Refills: 3 | Status: SHIPPED | OUTPATIENT
Start: 2024-09-30

## 2024-09-30 RX ORDER — AMLODIPINE BESYLATE 10 MG/1
10 TABLET ORAL DAILY
Qty: 90 TABLET | Refills: 3 | Status: SHIPPED | OUTPATIENT
Start: 2024-09-30

## 2024-09-30 RX ORDER — MINOXIDIL 2.5 MG/1
2.5 TABLET ORAL
Qty: 90 TABLET | Refills: 0 | Status: SHIPPED | OUTPATIENT
Start: 2024-09-30

## 2024-09-30 SDOH — SOCIAL DETERMINANTS OF HEALTH (SDOH): OTHER PHYSICAL AND MENTAL STRAIN RELATED TO WORK: Z56.6

## 2024-09-30 NOTE — TELEPHONE ENCOUNTER
No care due was identified.  Health [facility] Embedded Care Due Messages. Reference number: [id number].   9/30/2024 10:30:01 AM CDT

## 2024-09-30 NOTE — TELEPHONE ENCOUNTER
----- Message from KARL Herring sent at 9/30/2024 11:21 AM CDT -----  Can we set her up with Kip in panc cyst Clinic?  Naima Thomas, Debi Prado MA; Nevaeh Tineo, RN  Hi, can she be scheduled for a consult in pancreatic cyst clinic? Let me know if a referral is needed or if I should message a specific pool. I forgot if the process changed to get patients in. Thanks. -Naima

## 2024-10-07 NOTE — PROGRESS NOTES
Gastroenterology: [facility]      SUBJECTIVE:         Chief Complaint: Here for evaluation of a pancreatic cyst     History of Present Illness:  Patient is a 59 y.o. female presents with a pancreatic cyst. The cyst was first noted on MRI in 09/19/2024 which showed 1cm cystic lesion about the junction of the pancreatic body and head with no obvious connection to the PD as well as no abnormal enhancement and no PD dilatation.  It is not symptomatic. Previous studies include MRI.  Since initial detection, the cyst has not increased in size. The pancreatic duct is not dilated. Previous FNA of the cyst has not been done    Denies diarrhea, jaundice, unintentional weight loss, anorexia, abdominal pain. Denies history of pancreatitis. Denies history of diabetes. Denies family history of pancreatic cancer. Denies tobacco or alcohol use      Prior Imaging:    MRI Abdomen 09/19/2024  Impression:     Hepatomegaly with diffuse hepatic steatosis.  No suspicious focal hepatic lesions.     1.0 cm cystic lesion about the junction of the pancreatic body and head.  No obvious connection to the pancreatic duct as well as no abnormal enhancement.  No pancreatic ductal dilatation.  Recommend repeat MRI/MRCP in 1 year to ensure stability.     Splenomegaly.     Unremarkable appearance of the common bile duct and intrahepatic biliary ducts.      Personal/family factors:    There is not a family history of pancreatic cancer     The patient does not smoke.    ECOG status 0 - Asymptomatic        Review of Systems   Constitutional: no fever, chills or change in weight   Eyes: no visual changes   ENT: no sore throat or dysphagia  Respiratory: no cough or shortness of breath   Cardiovascular: no chest pain or palpitations   Gastrointestinal: as per HPI  Hematologic/Lymphatic: no easy bruising or lymphadenopathy   Musculoskeletal: no arthralgias or myalgias   Neurological: no seizures, tremors or change in mental  status  Behavioral/Psych: no auditory or visual hallucinations    Past Medical History:   Diagnosis Date    B12 deficiency anemia     Intramuscular replacement    Benign hypertension     Breast cancer     Left-sided diagnosed  status post mastectomy chemotherapy and radiation, focal atypical ductal hyperplasia of the right status post mastectomy 2009    Chronic depression     GERD (gastroesophageal reflux disease)     Hypercalcemia syndrome     Hyperlipidemia LDL goal <130     Hyperparathyroidism due to vitamin D deficiency     Insomnia     Lumbar radiculitis     Lymphedema of arm     Morbid obesity     Vitamin D deficiency disease        Past Surgical History:   Procedure Laterality Date    BREAST RECONSTRUCTION      Left and right     x2      COLONOSCOPY N/A 2024    Procedure: COLONOSCOPY;  Surgeon: Danielle Pérez MD;  Location: [facility] END[facility];  Service: Endoscopy;  Laterality: N/A;  Ref by Dr JOSE Sy, PEG, portal - PC  24- pc complete. DBM    Debridement of both breasts          Debridement of leg      2009    EYE MUSCLE SURGERY      Gastric sleeve  10/04/2022    Left mastectomy      2003    Leg grafting      Left leg , donor site right leg    LUMBAR SPINE SURGERY      2008    Right mastectomy      2009    TUBAL LIGATION         Family History   Problem Relation Name Age of Onset    COPD Mother      Heart disease Father      Hypertension Father      Hyperlipidemia Father      COPD Sister      Stroke Brother Stroke     Cirrhosis Neg Hx         Social History     Socioeconomic History    Marital status:     Number of children: 2    Years of education: "[employer]   Occupational History    Occupation: Works for a [employer] company     Employer: [employer]   Tobacco Use    Smoking status: Never     Passive exposure: Never    Smokeless tobacco: Never   Substance and Sexual Activity    Alcohol use: No    Drug use: No    Sexual activity: Yes     Partners: Male   Social History  Narrative    Adult Screenings updated and reviewed  4/10/14    Mammogram( for females) history of bilateral mastectomy    Pap ( for females)- years      Colonoscopy - done 2006 normal    Flu shot - 2013    Td 2005    Pneumovax updated 4/10/14 due again  2019    Zostavax recommended one time at  age  59    Eye exam  Overdue     Bone density overdue      Social Drivers of Health     Financial Resource Strain: Low Risk  (7/8/2024)    Overall Financial Resource Strain (CARDIA)     Difficulty of Paying Living Expenses: Not hard at all   Food Insecurity: No Food Insecurity (7/8/2024)    Hunger Vital Sign     Worried About Running Out of Food in the Last Year: Never true     Ran Out of Food in the Last Year: Never true   Physical Activity: Unknown (7/8/2024)    Exercise Vital Sign     Days of Exercise per Week: 1 day   Stress: No Stress Concern Present (7/8/2024)    [employer] [place] [employer] - Occupational Stress Questionnaire     Feeling of Stress : Only a little   Housing Stability: Unknown (7/8/2024)    Housing Stability Vital Sign     Unable to Pay for Housing in the Last Year: No       Current Outpatient Medications on File Prior to Visit   Medication Sig Dispense Refill    amLODIPine (NORVASC) 10 MG tablet Take 1 tablet (10 mg total) by mouth once daily. 90 tablet 3    brimonidine 0.2% (ALPHAGAN) 0.2 % Drop Place 1 drop into both eyes 2 (two) times daily.      buPROPion (WELLBUTRIN XL) 150 MG TB24 tablet TAKE 1 TABLET BY MOUTH EVERY DAY 90 tablet 3    celecoxib (CELEBREX) 200 MG capsule Take 1 capsule (200 mg total) by mouth once daily. 90 capsule 3    cetirizine (ZYRTEC) 10 MG tablet Take 10 mg by mouth once daily.      EScitalopram oxalate (LEXAPRO) 20 MG tablet Take 1 tablet (20 mg total) by mouth once daily. 90 tablet 3    esomeprazole (NEXIUM) 40 MG capsule Take 1 capsule (40 mg total) by mouth once daily. 90 capsule 3    fluticasone propionate (FLONASE) 50 mcg/actuation nasal spray 1 spray by  "Each Nostril route.      ipratropium (ATROVENT) 42 mcg (0.06 %) nasal spray 2 sprays by Each Nostril route 2 (two) times daily. 15 mL 11    LORazepam (ATIVAN) 1 MG tablet Take 1 tablet (1 mg total) by mouth every evening. 30 tablet 2    minoxidiL (LONITEN) 2.5 MG tablet TAKE 1 TABLET BY MOUTH EVERY DAY 90 tablet 0     No current facility-administered medications on file prior to visit.       Review of patient's allergies indicates:   Allergen Reactions    Codeine Nausea Only and Nausea And Vomiting       Physical Exam:  General: Well-developed, well-appearing, no acute distress  Neuro: alert and oriented to person, place, time; normal appearing gait  Eyes: No scleral icterus, pupils equally round, normal-appearing conjunctiva  Neck: supple; no cervical lymphadenopathy  CVS: regular rate and rhythm; no murmurs  Lungs: clear to auscultation bilaterally; no labored breathing, no wheezes  Abdomen: soft, non-distended, non-tender, no rebound tenderness or guarding, nomal bowel sounds  Extremities: no cyanosis, edema, or clubbing  Skin: no rash, no jaundice        Laboratory:   Lab Results   Component Value Date    ALT 30 06/27/2024    AST 23 06/27/2024    ALKPHOS 129 06/27/2024    BILITOT 0.5 06/27/2024     Lab Results   Component Value Date    WBC 9.09 06/27/2024    HGB 14.1 06/27/2024    HCT 42.5 06/27/2024    MCV 93 06/27/2024     06/27/2024     No results found for: "INR", "PROTIME"        Diagnostic/Imaging Results:  As above    ASSESSMENT/PLAN:     Pancreatic cyst in the the junction of the pancreatic body and head. Measuring 10 mm in size,  unchanged  Most likely etiology at this point is a  Undetermined    We discussed in detail the natural history of pancreatic cysts, the therapy involved, and the benefits of multimodality surveillance imaging including Ct, MRI, and EUS with FNA    1. Pancreatic cyst        Plan:      Pancreatic cyst  According to the [place] pancreatic cyst guidelines, a cyst of this " size with no concerning findings should undergo CT or MRI every 6 months for 1 year, then yearly for two years, then can lengthen interval up to 2 years if no change  Will order repeat MRI in march 2025 (6 months from last imaging)  If no changes, will tentatively plan for another MRI in sept 2025. If no changes after that, can discuss lengthening surveillance interval  Clinic follow up in 9 months to discuss further surveillance           Kip Paz PA-C  Department of Advanced Endoscopy  [facility]

## 2024-10-08 ENCOUNTER — OFFICE VISIT (OUTPATIENT)
Dept: GASTROENTEROLOGY | Facility: CLINIC | Age: 59
End: 2024-10-08
Payer: COMMERCIAL

## 2024-10-08 VITALS
HEART RATE: 73 BPM | SYSTOLIC BLOOD PRESSURE: 179 MMHG | WEIGHT: 293 LBS | HEIGHT: 66 IN | DIASTOLIC BLOOD PRESSURE: 96 MMHG | BODY MASS INDEX: 47.09 KG/M2

## 2024-10-08 DIAGNOSIS — K86.2 PANCREATIC CYST: Primary | ICD-10-CM

## 2024-10-08 PROCEDURE — 3077F SYST BP >= 140 MM HG: CPT | Mod: CPTII,S$GLB,,

## 2024-10-08 PROCEDURE — 1159F MED LIST DOCD IN RCRD: CPT | Mod: CPTII,S$GLB,,

## 2024-10-08 PROCEDURE — 3044F HG A1C LEVEL LT 7.0%: CPT | Mod: CPTII,S$GLB,,

## 2024-10-08 PROCEDURE — 99999 PR PBB SHADOW E&M-EST. PATIENT-LVL IV: CPT | Mod: PBBFAC,,,

## 2024-10-08 PROCEDURE — 3008F BODY MASS INDEX DOCD: CPT | Mod: CPTII,S$GLB,,

## 2024-10-08 PROCEDURE — 3080F DIAST BP >= 90 MM HG: CPT | Mod: CPTII,S$GLB,,

## 2024-10-08 PROCEDURE — 99214 OFFICE O/P EST MOD 30 MIN: CPT | Mod: S$GLB,,,

## 2024-10-15 ENCOUNTER — PATIENT MESSAGE (OUTPATIENT)
Dept: ADMINISTRATIVE | Facility: HOSPITAL | Age: 59
End: 2024-10-15
Payer: COMMERCIAL

## 2024-12-14 DIAGNOSIS — F51.04 PSYCHOPHYSIOLOGICAL INSOMNIA: ICD-10-CM

## 2024-12-14 NOTE — TELEPHONE ENCOUNTER
No care due was identified.  Health [facility] Embedded Care Due Messages. Reference number: [id number].   12/14/2024 11:59:36 AM CST

## 2024-12-16 RX ORDER — LORAZEPAM 1 MG/1
1 TABLET ORAL NIGHTLY
Qty: 30 TABLET | Refills: 2 | Status: SHIPPED | OUTPATIENT
Start: 2024-12-16

## 2024-12-27 ENCOUNTER — PATIENT OUTREACH (OUTPATIENT)
Dept: ADMINISTRATIVE | Facility: HOSPITAL | Age: 59
End: 2024-12-27
Payer: COMMERCIAL

## 2024-12-27 RX ORDER — FLUTICASONE PROPIONATE 50 MCG
SPRAY, SUSPENSION (ML) NASAL
COMMUNITY
Start: 2024-12-12

## 2024-12-27 RX ORDER — PROMETHAZINE HYDROCHLORIDE AND DEXTROMETHORPHAN HYDROBROMIDE 6.25; 15 MG/5ML; MG/5ML
SYRUP ORAL
COMMUNITY
Start: 2024-12-12

## 2024-12-27 RX ORDER — PREDNISONE 20 MG/1
TABLET ORAL
COMMUNITY
Start: 2024-12-12

## 2024-12-27 RX ORDER — GUAIFENESIN 600 MG/1
TABLET, EXTENDED RELEASE ORAL
COMMUNITY
Start: 2024-12-12

## 2024-12-27 NOTE — PROGRESS NOTES
Non-Compliant Blood Pressure Reading - the patient is unable to talk at this time, she's at the hospital with her sister. She requests a call back on Monday.    Overdue Pap Smear - patient unable to talk at this time.

## 2025-01-08 ENCOUNTER — PATIENT OUTREACH (OUTPATIENT)
Dept: ADMINISTRATIVE | Facility: HOSPITAL | Age: 60
End: 2025-01-08
Payer: COMMERCIAL

## 2025-03-03 DIAGNOSIS — K21.9 GASTROESOPHAGEAL REFLUX DISEASE, UNSPECIFIED WHETHER ESOPHAGITIS PRESENT: ICD-10-CM

## 2025-03-03 DIAGNOSIS — J31.0 CHRONIC RHINITIS: ICD-10-CM

## 2025-03-04 NOTE — TELEPHONE ENCOUNTER
No care due was identified.  Health [facility] Embedded Care Due Messages. Reference number: [id number].   3/03/2025 9:11:15 PM CST

## 2025-03-05 RX ORDER — OMEPRAZOLE 40 MG/1
40 CAPSULE, DELAYED RELEASE ORAL
Qty: 90 CAPSULE | Refills: 3 | OUTPATIENT
Start: 2025-03-05

## 2025-03-05 RX ORDER — IPRATROPIUM BROMIDE 42 UG/1
SPRAY, METERED NASAL
Qty: 45 ML | Refills: 1 | Status: SHIPPED | OUTPATIENT
Start: 2025-03-05

## 2025-03-05 NOTE — TELEPHONE ENCOUNTER
Refill Decision Note   Margie Cifuentes  is requesting a refill authorization.  Brief Assessment and Rationale for Refill:  Quick Discontinue  Approve     Medication Therapy Plan:  OMEPRAZOLE-The original prescription was discontinued on 6/5/2024 by Danielle Pérez MD for the following reason: Stop Taking at Discharge      Comments:     Note composed:4:58 AM 03/05/2025             stable

## 2025-03-26 ENCOUNTER — HOSPITAL ENCOUNTER (OUTPATIENT)
Dept: RADIOLOGY | Facility: HOSPITAL | Age: 60
Discharge: HOME | End: 2025-03-26
Payer: COMMERCIAL

## 2025-03-26 DIAGNOSIS — K86.2 PANCREATIC CYST: ICD-10-CM

## 2025-03-26 PROCEDURE — A9585 GADOBUTROL INJECTION: HCPCS

## 2025-03-26 PROCEDURE — 74183 MRI ABD W/O CNTR FLWD CNTR: CPT | Mod: TC

## 2025-03-26 PROCEDURE — 74183 MRI ABD W/O CNTR FLWD CNTR: CPT | Mod: 26,,, | Performed by: RADIOLOGY

## 2025-03-26 PROCEDURE — 25500020 PHARM REV CODE 255

## 2025-03-26 RX ORDER — GADOBUTROL 604.72 MG/ML
10 INJECTION INTRAVENOUS
Status: COMPLETED | OUTPATIENT
Start: 2025-03-26 | End: 2025-03-26

## 2025-03-26 RX ADMIN — GADOBUTROL 10 ML: 604.72 INJECTION INTRAVENOUS at 11:03

## 2025-04-09 DIAGNOSIS — F51.04 PSYCHOPHYSIOLOGICAL INSOMNIA: ICD-10-CM

## 2025-04-09 NOTE — TELEPHONE ENCOUNTER
Care Due:                  Date            Visit Type   Department     Provider  --------------------------------------------------------------------------------                                [facility]                              PRIMARY      / INTERNAL MED  Last Visit: 07-      CARE ([facility])   / GEMINIS         Diana Sy                               -         [facility] FAMILY MED                              PRIMARY      / INTERNAL MED  Next Visit: 07-      CARE ([facility])   / PEDMIKE Sy                                                            Last  Test          Frequency    Reason                     Performed    Due Date  --------------------------------------------------------------------------------    CBC.........  12 months..  celecoxib................  06- 06-    CMP.........  12 months..  celecoxib................  06- 06-    [employer] Embedded Care Due Messages. Reference number: [id number].   4/09/2025 5:20:21 AM CDT

## 2025-04-10 RX ORDER — LORAZEPAM 1 MG/1
1 TABLET ORAL NIGHTLY
Qty: 30 TABLET | Refills: 0 | Status: SHIPPED | OUTPATIENT
Start: 2025-04-10

## 2025-05-14 DIAGNOSIS — F51.04 PSYCHOPHYSIOLOGICAL INSOMNIA: ICD-10-CM

## 2025-05-14 RX ORDER — LORAZEPAM 1 MG/1
1 TABLET ORAL NIGHTLY
Qty: 30 TABLET | Refills: 0 | OUTPATIENT
Start: 2025-05-14

## 2025-05-14 NOTE — TELEPHONE ENCOUNTER
No care due was identified.  [facility] Embedded Care Due Messages. Reference number: [id number].   5/14/2025 10:31:51 AM CDT

## 2025-05-15 ENCOUNTER — LAB VISIT (OUTPATIENT)
Dept: LAB | Facility: HOSPITAL | Age: 60
End: 2025-05-15
Payer: COMMERCIAL

## 2025-05-15 DIAGNOSIS — Z79.899 CHRONIC PRESCRIPTION BENZODIAZEPINE USE: ICD-10-CM

## 2025-05-15 DIAGNOSIS — Z79.899 CHRONIC PRESCRIPTION BENZODIAZEPINE USE: Primary | ICD-10-CM

## 2025-05-15 PROCEDURE — 80307 DRUG TEST PRSMV CHEM ANLYZR: CPT

## 2025-05-16 ENCOUNTER — PATIENT MESSAGE (OUTPATIENT)
Dept: FAMILY MEDICINE | Facility: CLINIC | Age: 60
End: 2025-05-16
Payer: COMMERCIAL

## 2025-05-16 DIAGNOSIS — F51.04 PSYCHOPHYSIOLOGICAL INSOMNIA: ICD-10-CM

## 2025-05-16 LAB
AMPHET UR QL SCN: NEGATIVE
BARBITURATE SCN PRESENT UR: NEGATIVE
BENZODIAZ UR QL SCN: NEGATIVE
CANNABINOIDS UR QL SCN: NEGATIVE
COCAINE UR QL SCN: NEGATIVE
CREAT UR-MCNC: 168 MG/DL (ref 15–325)
ETHANOL UR-MCNC: <10 MG/DL
METHADONE UR QL SCN: NEGATIVE
OPIATES UR QL SCN: NEGATIVE
PCP UR QL: NEGATIVE

## 2025-05-16 RX ORDER — LORAZEPAM 1 MG/1
1 TABLET ORAL NIGHTLY
Qty: 30 TABLET | Refills: 2 | Status: SHIPPED | OUTPATIENT
Start: 2025-05-16

## 2025-05-22 ENCOUNTER — RESULTS FOLLOW-UP (OUTPATIENT)
Dept: GASTROENTEROLOGY | Facility: HOSPITAL | Age: 60
End: 2025-05-22

## 2025-07-09 DIAGNOSIS — I10 BENIGN HYPERTENSION: ICD-10-CM

## 2025-07-14 ENCOUNTER — PATIENT MESSAGE (OUTPATIENT)
Dept: ADMINISTRATIVE | Facility: HOSPITAL | Age: 60
End: 2025-07-14
Payer: COMMERCIAL

## 2025-07-22 ENCOUNTER — OFFICE VISIT (OUTPATIENT)
Dept: FAMILY MEDICINE | Facility: CLINIC | Age: 60
End: 2025-07-22
Payer: COMMERCIAL

## 2025-07-22 VITALS
HEART RATE: 75 BPM | DIASTOLIC BLOOD PRESSURE: 70 MMHG | TEMPERATURE: 98 F | BODY MASS INDEX: 47.09 KG/M2 | SYSTOLIC BLOOD PRESSURE: 130 MMHG | HEIGHT: 66 IN | OXYGEN SATURATION: 97 % | RESPIRATION RATE: 18 BRPM | WEIGHT: 293 LBS

## 2025-07-22 DIAGNOSIS — Z00.00 ANNUAL PHYSICAL EXAM: Primary | ICD-10-CM

## 2025-07-22 DIAGNOSIS — I10 ESSENTIAL HYPERTENSION: ICD-10-CM

## 2025-07-22 DIAGNOSIS — E88.01 LOW PLASMA ALPHA-1 ANTITRYPSIN: ICD-10-CM

## 2025-07-22 DIAGNOSIS — M51.369 DEGENERATION OF INTERVERTEBRAL DISC OF LUMBAR REGION, UNSPECIFIED WHETHER PAIN PRESENT: ICD-10-CM

## 2025-07-22 DIAGNOSIS — Z86.39 H/O HYPERLIPIDEMIA: ICD-10-CM

## 2025-07-22 DIAGNOSIS — K21.9 GASTROESOPHAGEAL REFLUX DISEASE, UNSPECIFIED WHETHER ESOPHAGITIS PRESENT: ICD-10-CM

## 2025-07-22 DIAGNOSIS — F32.0 CURRENT MILD EPISODE OF MAJOR DEPRESSIVE DISORDER WITHOUT PRIOR EPISODE: ICD-10-CM

## 2025-07-22 DIAGNOSIS — F51.04 PSYCHOPHYSIOLOGICAL INSOMNIA: ICD-10-CM

## 2025-07-22 DIAGNOSIS — K76.0 METABOLIC DYSFUNCTION-ASSOCIATED STEATOTIC LIVER DISEASE (MASLD): ICD-10-CM

## 2025-07-22 DIAGNOSIS — E66.01 MORBID (SEVERE) OBESITY DUE TO EXCESS CALORIES: ICD-10-CM

## 2025-07-22 PROCEDURE — 99396 PREV VISIT EST AGE 40-64: CPT | Mod: S$GLB,,, | Performed by: FAMILY MEDICINE

## 2025-07-22 PROCEDURE — 3075F SYST BP GE 130 - 139MM HG: CPT | Mod: CPTII,S$GLB,, | Performed by: FAMILY MEDICINE

## 2025-07-22 PROCEDURE — 1160F RVW MEDS BY RX/DR IN RCRD: CPT | Mod: CPTII,S$GLB,, | Performed by: FAMILY MEDICINE

## 2025-07-22 PROCEDURE — 3078F DIAST BP <80 MM HG: CPT | Mod: CPTII,S$GLB,, | Performed by: FAMILY MEDICINE

## 2025-07-22 PROCEDURE — 3008F BODY MASS INDEX DOCD: CPT | Mod: CPTII,S$GLB,, | Performed by: FAMILY MEDICINE

## 2025-07-22 PROCEDURE — 1159F MED LIST DOCD IN RCRD: CPT | Mod: CPTII,S$GLB,, | Performed by: FAMILY MEDICINE

## 2025-07-22 PROCEDURE — 99999 PR PBB SHADOW E&M-EST. PATIENT-LVL IV: CPT | Mod: PBBFAC,,, | Performed by: FAMILY MEDICINE

## 2025-07-22 RX ORDER — LOTEPREDNOL ETABONATE 5 MG/G
1 GEL OPHTHALMIC 4 TIMES DAILY
COMMUNITY
Start: 2025-07-08

## 2025-07-22 RX ORDER — ESCITALOPRAM OXALATE 20 MG/1
20 TABLET ORAL DAILY
Qty: 90 TABLET | Refills: 3 | Status: SHIPPED | OUTPATIENT
Start: 2025-07-22

## 2025-07-22 RX ORDER — BUPROPION HYDROCHLORIDE 300 MG/1
300 TABLET ORAL DAILY
Qty: 90 TABLET | Refills: 3 | Status: SHIPPED | OUTPATIENT
Start: 2025-07-22

## 2025-07-22 RX ORDER — BUPROPION HYDROCHLORIDE 300 MG/1
300 TABLET ORAL DAILY
Qty: 30 TABLET | Refills: 11 | Status: SHIPPED | OUTPATIENT
Start: 2025-07-22 | End: 2025-07-22 | Stop reason: SDUPTHER

## 2025-07-22 RX ORDER — OMEPRAZOLE 40 MG/1
40 CAPSULE, DELAYED RELEASE ORAL DAILY
Qty: 90 CAPSULE | Refills: 3 | Status: SHIPPED | OUTPATIENT
Start: 2025-07-22 | End: 2026-07-22

## 2025-07-22 NOTE — PROGRESS NOTES
Assessment & Plan:    Annual physical exam  H/O hyperlipidemia  -     CBC Auto Differential; Future; Expected date: 07/22/2025  -     Comprehensive Metabolic Panel; Future; Expected date: 07/22/2025  -     Hemoglobin A1C; Future; Expected date: 07/22/2025  -     Lipid Panel; Future; Expected date: 07/22/2025    Fasting labs to be scheduled.    Current mild episode of major depressive disorder without prior episode  -     EScitalopram oxalate (LEXAPRO) 20 MG tablet; Take 1 tablet (20 mg total) by mouth once daily.  Dispense: 90 tablet; Refill: 3  -     buPROPion (WELLBUTRIN XL) 300 MG 24 hr tablet; Take 1 tablet (300 mg total) by mouth once daily.  Dispense: 90 tablet; Refill: 3    Increase Wellbutrin XL to 300 mg. Allow up to 6 weeks to be fully effective.    Gastroesophageal reflux disease, unspecified whether esophagitis present  -     omeprazole (PRILOSEC) 40 MG capsule; Take 1 capsule (40 mg total) by mouth once daily.  Dispense: 90 capsule; Refill: 3    Controlled. Medication(s) refilled.     Essential hypertension  Controlled. Medication(s) refilled.     Morbid (severe) obesity due to excess calories  Metabolic dysfunction-associated steatotic liver disease (MASLD)  Low plasma alpha-1 antitrypsin  Continue targeted dietary and lifestyle modifications to lose weight and lower risk of adverse health conditions. Following with Hepatology.     Psychophysiological insomnia  Controlled. Continue current therapy.     Degeneration of intervertebral disc of lumbar region, unspecified whether pain present  Discussed benefit of osteopathic treatment for chronic back pain. She is welcome to follow up if she is interested in treatment.     Health maintenance reviewed:  -Recommended Pap Smear    Recommended RSV vaccine.      Follow-up: Follow up in about 1 year (around 7/22/2026).  ______________________________________________________________________    Chief Complaint  Chief Complaint   Patient presents with    Annual  Exam       HPI  Margie Cifuentes is a 60 y.o. female with medical diagnoses as listed in the medical history and problem list that presents to the office for an annual exam. Patient is in her usual state of health today and does not report any new concerns. Her antidepressants seems to be less effective lately. She continues to work a full-time job, at least 12 hours a day.     Following with Hepatology for hepatic fibrosis.       Health Maintenance         Date Due Completion Date    Cervical Cancer Screening 10/10/2022 10/10/2019    RSV Vaccine (Age 60+ and Pregnant patients) (1 - Risk 60-74 years 1-dose series) Never done ---    COVID-19 Vaccine (3 - - season) 2024    Hemoglobin A1c 2025    Influenza Vaccine (1) 2025 10/25/2022    Colorectal Cancer Screening 2027    Lipid Panel 2029    TETANUS VACCINE 2029              PAST MEDICAL HISTORY:  Past Medical History:   Diagnosis Date    B12 deficiency anemia     Intramuscular replacement    Benign hypertension     Breast cancer     Left-sided diagnosed  status post mastectomy chemotherapy and radiation, focal atypical ductal hyperplasia of the right status post mastectomy 2009    Chronic depression     GERD (gastroesophageal reflux disease)     Hypercalcemia syndrome     Hyperlipidemia LDL goal <130     Hyperparathyroidism due to vitamin D deficiency     Insomnia     Lumbar radiculitis     Lymphedema of arm     Morbid obesity     Vitamin D deficiency disease        PAST SURGICAL HISTORY:  Past Surgical History:   Procedure Laterality Date    BREAST RECONSTRUCTION      Left and right     x2      COLONOSCOPY N/A 2024    Procedure: COLONOSCOPY;  Surgeon: Danielle Pérez MD;  Location: [facility];  Service: Endoscopy;  Laterality: N/A;  Ref by Dr JOSE Sy, PEG, portal - PC  24- pc complete. DBM    Debridement of both breasts          Debridement of  "leg      2009    EYE MUSCLE SURGERY      Gastric sleeve  10/04/2022    Left mastectomy      2003    Leg grafting      Left leg 2009, donor site right leg    LUMBAR SPINE SURGERY      2008    Right mastectomy      2009    TUBAL LIGATION         SOCIAL HISTORY:  Social History[1]    FAMILY HISTORY:  Family History   Problem Relation Name Age of Onset    COPD Mother      Heart disease Father      Hypertension Father      Hyperlipidemia Father      COPD Sister      Stroke Brother Stroke     Cirrhosis Neg Hx         ALLERGIES AND MEDICATIONS: updated and reviewed.  Review of patient's allergies indicates:   Allergen Reactions    Codeine Nausea Only and Nausea And Vomiting     Current Medications[2]      ROS  Review of Systems   Constitutional:  Positive for unexpected weight change (gain).   Psychiatric/Behavioral:  The patient is nervous/anxious.            Physical Exam  Vitals:    07/22/25 1523   BP: 130/70   Patient Position: Sitting   Pulse: 75   Resp: 18   Temp: 98.2 °F (36.8 °C)   TempSrc: Oral   SpO2: 97%   Weight: 134.2 kg (295 lb 15.5 oz)   Height: 5' 6" (1.676 m)    Body mass index is 47.77 kg/m².  Weight: 134.2 kg (295 lb 15.5 oz)   Height: 5' 6" (167.6 cm)   Physical Exam  Constitutional:       General: She is not in acute distress.     Appearance: She is obese.   HENT:      Head: Normocephalic and atraumatic.   Neck:      Thyroid: No thyromegaly.      Vascular: No carotid bruit.   Cardiovascular:      Rate and Rhythm: Normal rate and regular rhythm.      Pulses: Normal pulses.      Heart sounds: Normal heart sounds.   Pulmonary:      Effort: Pulmonary effort is normal. No respiratory distress.      Breath sounds: Normal breath sounds.   Musculoskeletal:      Cervical back: Neck supple.   Lymphadenopathy:      Cervical: No cervical adenopathy.   Skin:     General: Skin is warm and dry.      Findings: No rash.   Neurological:      General: No focal deficit present.      Mental Status: She is alert and " oriented to person, place, and time.   Psychiatric:         Mood and Affect: Mood normal.         Behavior: Behavior normal.         Thought Content: Thought content normal.                  [1]   Social History  Socioeconomic History    Marital status:     Number of children: 2    Years of education: votech   Occupational History    Occupation: Works for a [employer] company     Employer: [employer]   Tobacco Use    Smoking status: Never     Passive exposure: Never    Smokeless tobacco: Never   Substance and Sexual Activity    Alcohol use: No    Drug use: No    Sexual activity: Yes     Partners: Male   Social History Narrative    Adult Screenings updated and reviewed  4/10/14    Mammogram( for females) history of bilateral mastectomy    Pap ( for females)- years      Colonoscopy - done 2006 normal    Flu shot - 2013    Td 2005    Pneumovax updated 4/10/14 due again  2019    Zostavax recommended one time at  age  59    Eye exam  Overdue     Bone density overdue      Social Drivers of Health     Financial Resource Strain: Low Risk  (7/16/2025)    Overall Financial Resource Strain (CARDIA)     Difficulty of Paying Living Expenses: Not hard at all   Food Insecurity: No Food Insecurity (7/16/2025)    Hunger Vital Sign     Worried About Running Out of Food in the Last Year: Never true     Ran Out of Food in the Last Year: Never true   Transportation Needs: No Transportation Needs (7/16/2025)    PRAPARE - Transportation     Lack of Transportation (Medical): No     Lack of Transportation (Non-Medical): No   Physical Activity: Unknown (7/16/2025)    Exercise Vital Sign     Days of Exercise per Week: 0 days   Stress: Stress Concern Present (7/16/2025)    [employer] [place] [employer] - Occupational Stress Questionnaire     Feeling of Stress : To some extent   Housing Stability: Low Risk  (7/16/2025)    Housing Stability Vital Sign     Unable to Pay for Housing in the Last Year: No     Number of Times  Moved in the Last Year: 0     Homeless in the Last Year: No   [2]   Current Outpatient Medications   Medication Sig Dispense Refill    amLODIPine (NORVASC) 10 MG tablet Take 1 tablet (10 mg total) by mouth once daily. 90 tablet 3    brimonidine 0.2% (ALPHAGAN) 0.2 % Drop Place 1 drop into both eyes 2 (two) times daily.      celecoxib (CELEBREX) 200 MG capsule Take 1 capsule (200 mg total) by mouth once daily. 90 capsule 3    cetirizine (ZYRTEC) 10 MG tablet Take 10 mg by mouth once daily.      cetirizine HCl (ZYRTEC ORAL) ZyrTEC Allergy      fluticasone propionate (FLONASE) 50 mcg/actuation nasal spray 1 spray by Each Nostril route.      fluticasone propionate (FLONASE) 50 mcg/actuation nasal spray 1 spray in each nostril Nasally twice a day for 30 days      ipratropium (ATROVENT) 42 mcg (0.06 %) nasal spray SPRAY 2 SPRAYS BY EACH NOSTRIL ROUTE 2 TIMES DAILY. 45 mL 1    LORazepam (ATIVAN) 1 MG tablet Take 1 tablet (1 mg total) by mouth every evening. 30 tablet 2    loteprednol etabonate (LOTEMAX) 0.5 % DrpG Place 1 drop into both eyes 4 (four) times daily.      minoxidiL (LONITEN) 2.5 MG tablet TAKE 1 TABLET BY MOUTH EVERY DAY 90 tablet 0    MINOXIDIL TOP Minoxidil      promethazine-dextromethorphan (PROMETHAZINE-DM) 6.25-15 mg/5 mL Syrp 5 mL as needed Orally nightly      buPROPion (WELLBUTRIN XL) 300 MG 24 hr tablet Take 1 tablet (300 mg total) by mouth once daily. 90 tablet 3    EScitalopram oxalate (LEXAPRO) 20 MG tablet Take 1 tablet (20 mg total) by mouth once daily. 90 tablet 3    omeprazole (PRILOSEC) 40 MG capsule Take 1 capsule (40 mg total) by mouth once daily. 90 capsule 3     No current facility-administered medications for this visit.

## 2025-07-29 ENCOUNTER — LAB VISIT (OUTPATIENT)
Dept: LAB | Facility: HOSPITAL | Age: 60
End: 2025-07-29
Attending: FAMILY MEDICINE
Payer: COMMERCIAL

## 2025-07-29 DIAGNOSIS — Z00.00 ANNUAL PHYSICAL EXAM: ICD-10-CM

## 2025-07-29 LAB
ABSOLUTE EOSINOPHIL (OHS): 0.22 K/UL
ABSOLUTE MONOCYTE (OHS): 0.52 K/UL (ref 0.3–1)
ABSOLUTE NEUTROPHIL COUNT (OHS): 3.93 K/UL (ref 1.8–7.7)
ALBUMIN SERPL BCP-MCNC: 3.7 G/DL (ref 3.5–5.2)
ALP SERPL-CCNC: 121 UNIT/L (ref 40–150)
ALT SERPL W/O P-5'-P-CCNC: 50 UNIT/L (ref 0–55)
ANION GAP (OHS): 6 MMOL/L (ref 8–16)
AST SERPL-CCNC: 45 UNIT/L (ref 0–50)
BASOPHILS # BLD AUTO: 0.07 K/UL
BASOPHILS NFR BLD AUTO: 1 %
BILIRUB SERPL-MCNC: 0.3 MG/DL (ref 0.1–1)
BUN SERPL-MCNC: 14 MG/DL (ref 8–23)
CALCIUM SERPL-MCNC: 9.4 MG/DL (ref 8.7–10.5)
CHLORIDE SERPL-SCNC: 109 MMOL/L (ref 95–110)
CHOLEST SERPL-MCNC: 214 MG/DL (ref 120–199)
CHOLEST/HDLC SERPL: 4.7 {RATIO} (ref 2–5)
CO2 SERPL-SCNC: 27 MMOL/L (ref 23–29)
CREAT SERPL-MCNC: 1 MG/DL (ref 0.5–1.4)
EAG (OHS): 97 MG/DL (ref 68–131)
ERYTHROCYTE [DISTWIDTH] IN BLOOD BY AUTOMATED COUNT: 13.2 % (ref 11.5–14.5)
GFR SERPLBLD CREATININE-BSD FMLA CKD-EPI: >60 ML/MIN/1.73/M2
GLUCOSE SERPL-MCNC: 96 MG/DL (ref 70–110)
HBA1C MFR BLD: 5 % (ref 4–5.6)
HCT VFR BLD AUTO: 41.4 % (ref 37–48.5)
HDLC SERPL-MCNC: 46 MG/DL (ref 40–75)
HDLC SERPL: 21.5 % (ref 20–50)
HGB BLD-MCNC: 13.8 GM/DL (ref 12–16)
IMM GRANULOCYTES # BLD AUTO: 0.03 K/UL (ref 0–0.04)
IMM GRANULOCYTES NFR BLD AUTO: 0.4 % (ref 0–0.5)
LDLC SERPL CALC-MCNC: 144.6 MG/DL (ref 63–159)
LYMPHOCYTES # BLD AUTO: 2.16 K/UL (ref 1–4.8)
MCH RBC QN AUTO: 30.6 PG (ref 27–31)
MCHC RBC AUTO-ENTMCNC: 33.3 G/DL (ref 32–36)
MCV RBC AUTO: 92 FL (ref 82–98)
NONHDLC SERPL-MCNC: 168 MG/DL
NUCLEATED RBC (/100WBC) (OHS): 0 /100 WBC
PLATELET # BLD AUTO: 264 K/UL (ref 150–450)
PMV BLD AUTO: 10 FL (ref 9.2–12.9)
POTASSIUM SERPL-SCNC: 4.1 MMOL/L (ref 3.5–5.1)
PROT SERPL-MCNC: 7.5 GM/DL (ref 6–8.4)
RBC # BLD AUTO: 4.51 M/UL (ref 4–5.4)
RELATIVE EOSINOPHIL (OHS): 3.2 %
RELATIVE LYMPHOCYTE (OHS): 31.2 % (ref 18–48)
RELATIVE MONOCYTE (OHS): 7.5 % (ref 4–15)
RELATIVE NEUTROPHIL (OHS): 56.7 % (ref 38–73)
SODIUM SERPL-SCNC: 142 MMOL/L (ref 136–145)
TRIGL SERPL-MCNC: 117 MG/DL (ref 30–150)
WBC # BLD AUTO: 6.93 K/UL (ref 3.9–12.7)

## 2025-07-29 PROCEDURE — 85025 COMPLETE CBC W/AUTO DIFF WBC: CPT

## 2025-07-29 PROCEDURE — 83718 ASSAY OF LIPOPROTEIN: CPT

## 2025-07-29 PROCEDURE — 83036 HEMOGLOBIN GLYCOSYLATED A1C: CPT

## 2025-07-29 PROCEDURE — 80053 COMPREHEN METABOLIC PANEL: CPT

## 2025-07-29 PROCEDURE — 36415 COLL VENOUS BLD VENIPUNCTURE: CPT | Mod: PO

## 2025-07-31 ENCOUNTER — PATIENT MESSAGE (OUTPATIENT)
Dept: FAMILY MEDICINE | Facility: CLINIC | Age: 60
End: 2025-07-31
Payer: COMMERCIAL

## 2025-07-31 DIAGNOSIS — E78.5 HYPERLIPIDEMIA, UNSPECIFIED HYPERLIPIDEMIA TYPE: Primary | ICD-10-CM

## 2025-07-31 RX ORDER — ATORVASTATIN CALCIUM 10 MG/1
10 TABLET, FILM COATED ORAL DAILY
Qty: 90 TABLET | Refills: 3 | Status: SHIPPED | OUTPATIENT
Start: 2025-07-31 | End: 2026-07-31

## 2025-09-05 DIAGNOSIS — J31.0 CHRONIC RHINITIS: ICD-10-CM

## 2025-09-05 RX ORDER — IPRATROPIUM BROMIDE 42 UG/1
SPRAY, METERED NASAL
Qty: 45 ML | Refills: 3 | Status: SHIPPED | OUTPATIENT
Start: 2025-09-05